# Patient Record
Sex: FEMALE | Race: ASIAN | NOT HISPANIC OR LATINO
[De-identification: names, ages, dates, MRNs, and addresses within clinical notes are randomized per-mention and may not be internally consistent; named-entity substitution may affect disease eponyms.]

---

## 2017-01-03 ENCOUNTER — APPOINTMENT (OUTPATIENT)
Dept: INTERNAL MEDICINE | Facility: CLINIC | Age: 77
End: 2017-01-03

## 2017-01-03 VITALS
WEIGHT: 98 LBS | DIASTOLIC BLOOD PRESSURE: 86 MMHG | HEART RATE: 88 BPM | BODY MASS INDEX: 19.76 KG/M2 | SYSTOLIC BLOOD PRESSURE: 132 MMHG | TEMPERATURE: 97.8 F | HEIGHT: 59 IN | OXYGEN SATURATION: 97 %

## 2017-01-04 LAB
25(OH)D3 SERPL-MCNC: 31 NG/ML
ALBUMIN SERPL ELPH-MCNC: 4.3 G/DL
ALP BLD-CCNC: 86 U/L
ALT SERPL-CCNC: 16 U/L
ANION GAP SERPL CALC-SCNC: 13 MMOL/L
AST SERPL-CCNC: 21 U/L
BASOPHILS # BLD AUTO: 0.01 K/UL
BASOPHILS NFR BLD AUTO: 0.2 %
BILIRUB SERPL-MCNC: 0.2 MG/DL
BUN SERPL-MCNC: 16 MG/DL
CALCIUM SERPL-MCNC: 9.6 MG/DL
CHLORIDE SERPL-SCNC: 104 MMOL/L
CHOLEST SERPL-MCNC: 209 MG/DL
CHOLEST/HDLC SERPL: 2.6 RATIO
CO2 SERPL-SCNC: 25 MMOL/L
CREAT SERPL-MCNC: 0.73 MG/DL
EOSINOPHIL # BLD AUTO: 0.02 K/UL
EOSINOPHIL NFR BLD AUTO: 0.3 %
GLUCOSE SERPL-MCNC: 102 MG/DL
HBA1C MFR BLD HPLC: 5.7 %
HCT VFR BLD CALC: 36.3 %
HDLC SERPL-MCNC: 80 MG/DL
HGB BLD-MCNC: 11.8 G/DL
IMM GRANULOCYTES NFR BLD AUTO: 0.2 %
LDLC SERPL CALC-MCNC: 113 MG/DL
LYMPHOCYTES # BLD AUTO: 2.05 K/UL
LYMPHOCYTES NFR BLD AUTO: 31.8 %
MAN DIFF?: NORMAL
MCHC RBC-ENTMCNC: 30.3 PG
MCHC RBC-ENTMCNC: 32.5 GM/DL
MCV RBC AUTO: 93.1 FL
MONOCYTES # BLD AUTO: 0.41 K/UL
MONOCYTES NFR BLD AUTO: 6.4 %
NEUTROPHILS # BLD AUTO: 3.94 K/UL
NEUTROPHILS NFR BLD AUTO: 61.1 %
PLATELET # BLD AUTO: 286 K/UL
POTASSIUM SERPL-SCNC: 5 MMOL/L
PROT SERPL-MCNC: 7.4 G/DL
RBC # BLD: 3.9 M/UL
RBC # FLD: 12.7 %
SODIUM SERPL-SCNC: 142 MMOL/L
TRIGL SERPL-MCNC: 81 MG/DL
TSH SERPL-ACNC: 1.55 UIU/ML
WBC # FLD AUTO: 6.44 K/UL

## 2017-01-10 ENCOUNTER — APPOINTMENT (OUTPATIENT)
Dept: CV DIAGNOSITCS | Facility: HOSPITAL | Age: 77
End: 2017-01-10

## 2017-02-02 ENCOUNTER — OTHER (OUTPATIENT)
Age: 77
End: 2017-02-02

## 2017-02-07 ENCOUNTER — APPOINTMENT (OUTPATIENT)
Dept: INTERNAL MEDICINE | Facility: CLINIC | Age: 77
End: 2017-02-07

## 2017-02-07 ENCOUNTER — APPOINTMENT (OUTPATIENT)
Dept: CARDIOLOGY | Facility: CLINIC | Age: 77
End: 2017-02-07

## 2017-02-07 ENCOUNTER — NON-APPOINTMENT (OUTPATIENT)
Age: 77
End: 2017-02-07

## 2017-02-07 VITALS
HEIGHT: 59 IN | HEART RATE: 91 BPM | DIASTOLIC BLOOD PRESSURE: 84 MMHG | WEIGHT: 96 LBS | OXYGEN SATURATION: 98 % | TEMPERATURE: 97.9 F | BODY MASS INDEX: 19.35 KG/M2 | SYSTOLIC BLOOD PRESSURE: 144 MMHG

## 2017-02-07 VITALS
HEART RATE: 74 BPM | SYSTOLIC BLOOD PRESSURE: 120 MMHG | BODY MASS INDEX: 18.95 KG/M2 | WEIGHT: 94 LBS | TEMPERATURE: 97.8 F | DIASTOLIC BLOOD PRESSURE: 70 MMHG | HEIGHT: 59 IN

## 2017-02-07 VITALS — DIASTOLIC BLOOD PRESSURE: 72 MMHG | SYSTOLIC BLOOD PRESSURE: 130 MMHG

## 2017-03-01 ENCOUNTER — MEDICATION RENEWAL (OUTPATIENT)
Age: 77
End: 2017-03-01

## 2017-03-01 ENCOUNTER — APPOINTMENT (OUTPATIENT)
Dept: CARDIOLOGY | Facility: CLINIC | Age: 77
End: 2017-03-01

## 2017-03-21 ENCOUNTER — MEDICATION RENEWAL (OUTPATIENT)
Age: 77
End: 2017-03-21

## 2017-05-24 ENCOUNTER — RX RENEWAL (OUTPATIENT)
Age: 77
End: 2017-05-24

## 2017-06-16 ENCOUNTER — RX RENEWAL (OUTPATIENT)
Age: 77
End: 2017-06-16

## 2017-08-11 ENCOUNTER — APPOINTMENT (OUTPATIENT)
Dept: INTERNAL MEDICINE | Facility: CLINIC | Age: 77
End: 2017-08-11
Payer: MEDICARE

## 2017-08-11 VITALS
WEIGHT: 97 LBS | BODY MASS INDEX: 19.56 KG/M2 | DIASTOLIC BLOOD PRESSURE: 80 MMHG | HEART RATE: 75 BPM | HEIGHT: 59 IN | OXYGEN SATURATION: 96 % | SYSTOLIC BLOOD PRESSURE: 132 MMHG | TEMPERATURE: 97.9 F

## 2017-08-11 PROCEDURE — 36415 COLL VENOUS BLD VENIPUNCTURE: CPT

## 2017-08-11 PROCEDURE — 99213 OFFICE O/P EST LOW 20 MIN: CPT | Mod: 25

## 2017-08-11 RX ORDER — CHROMIUM 200 MCG
1000 TABLET ORAL
Qty: 100 | Refills: 0 | Status: DISCONTINUED | COMMUNITY
Start: 2017-06-16 | End: 2017-08-11

## 2017-08-12 LAB — 25(OH)D3 SERPL-MCNC: 35.9 NG/ML

## 2017-08-14 ENCOUNTER — RX RENEWAL (OUTPATIENT)
Age: 77
End: 2017-08-14

## 2017-08-27 ENCOUNTER — EMERGENCY (EMERGENCY)
Facility: HOSPITAL | Age: 77
LOS: 1 days | Discharge: HOME | End: 2017-08-27
Attending: EMERGENCY MEDICINE | Admitting: EMERGENCY MEDICINE
Payer: COMMERCIAL

## 2017-08-27 VITALS
SYSTOLIC BLOOD PRESSURE: 157 MMHG | RESPIRATION RATE: 18 BRPM | WEIGHT: 95.9 LBS | DIASTOLIC BLOOD PRESSURE: 91 MMHG | OXYGEN SATURATION: 100 % | HEART RATE: 83 BPM | TEMPERATURE: 99 F

## 2017-08-27 DIAGNOSIS — Z90.710 ACQUIRED ABSENCE OF BOTH CERVIX AND UTERUS: Chronic | ICD-10-CM

## 2017-08-27 PROCEDURE — 99284 EMERGENCY DEPT VISIT MOD MDM: CPT | Mod: 25

## 2017-08-27 PROCEDURE — 71046 X-RAY EXAM CHEST 2 VIEWS: CPT

## 2017-08-27 PROCEDURE — 71020: CPT | Mod: 26

## 2017-08-27 PROCEDURE — 71100 X-RAY EXAM RIBS UNI 2 VIEWS: CPT

## 2017-08-27 PROCEDURE — 99284 EMERGENCY DEPT VISIT MOD MDM: CPT

## 2017-08-27 PROCEDURE — 71100 X-RAY EXAM RIBS UNI 2 VIEWS: CPT | Mod: 26

## 2017-08-27 RX ORDER — LIDOCAINE 4 G/100G
1 CREAM TOPICAL ONCE
Qty: 0 | Refills: 0 | Status: COMPLETED | OUTPATIENT
Start: 2017-08-27 | End: 2017-08-27

## 2017-08-27 RX ORDER — ACETAMINOPHEN 500 MG
650 TABLET ORAL ONCE
Qty: 0 | Refills: 0 | Status: COMPLETED | OUTPATIENT
Start: 2017-08-27 | End: 2017-08-27

## 2017-08-27 RX ORDER — LIDOCAINE 4 G/100G
1 CREAM TOPICAL
Qty: 5 | Refills: 0 | OUTPATIENT
Start: 2017-08-27 | End: 2017-09-01

## 2017-08-27 RX ADMIN — Medication 650 MILLIGRAM(S): at 17:06

## 2017-08-27 RX ADMIN — LIDOCAINE 1 PATCH: 4 CREAM TOPICAL at 17:06

## 2017-08-27 NOTE — ED PROVIDER NOTE - PROGRESS NOTE DETAILS
Pt pain improved with lidoderm patch and tylenol, Xray results discussed with patient, pt demonstrates use of incentive spirometer, advised to follow up with primary care doctor in 24-48 hours, she verbalizes understanding of follow up plan Attending MD Ryan: Patient re-evaluated and feelign better.  Radiology tests reviewed with patient and family.  Feels improved with lidocaine patch, will Rx.  Patient stable for discharge with incentive spirometer. Follow up instructions given, importance of follow up emphasized, return to ED parameters reviewed and patient verbalized understanding.  All questions answered, all concerns addressed.

## 2017-08-27 NOTE — ED PROVIDER NOTE - MEDICAL DECISION MAKING DETAILS
78 yo F PMHx osteoporosis rolled out of bed onto right back 3 days ago and reports persistent pain to right side of back, CXR, rib series, pain control, reevaluate

## 2017-08-27 NOTE — ED PROVIDER NOTE - NS ED ROS FT
GENERAL: No fever or chills, EYES: no change in vision, HEENT: no trouble swallowing or speaking, CARDIAC: no chest pain, PULMONARY: no cough, pain to right side of back with deep inspiration, GI: no abdominal pain, no nausea or no vomiting, no diarrhea or constipation, : No changes in urination, SKIN: no rashes, NEURO: no headache,  MSK: No joint pain ~Leeanna Simpson M.D. Resident

## 2017-08-27 NOTE — ED PROVIDER NOTE - OBJECTIVE STATEMENT
77 y F PMHx osteoporosis p/w fall 3 days ago. Pt. rolled out of bed onto right side of her back. She was trying to manage the pain at home with ibuprofen every four hours with minimal relief. Pt reports pain is next to her scapula and she has pain with deep breaths. Denies other areas of trauma, did not hit her head, denies headaches, vision changes, chest pain, palpitations.

## 2017-08-27 NOTE — ED PROVIDER NOTE - PHYSICAL EXAMINATION
Gen: AAOx3, non-toxic  Head: NCAT  HEENT: EOMI, oral mucosa moist, normal conjunctiva  Lung: CTAB, no respiratory distress, no wheezes/rhonchi/rales B/L, speaking in full sentences  CV: RRR, no murmurs, rubs or gallops  Abd: soft, NTND, no guarding  MSK: no visible deformities, tenderness to posterior thoracic ribs medial to right scapula  Neuro: No focal sensory or motor deficits  Skin: Warm, well perfused, no rash  Psych: normal affect.   ~Leeanna Simpson M.D. Resident

## 2017-08-27 NOTE — ED PROVIDER NOTE - PLAN OF CARE
You were seen in the Emergency Department for back pain on right side following a fall out of bed.   1) Advance activity as tolerated.   2) Use your incentive spirometer every hour.   3) Continue all previously prescribed medications as directed.    4) Follow up with your primary care physician in 24-48 hours   5) Return to the Emergency Department for worsening or persistent symptoms, and/or ANY NEW OR CONCERNING SYMPTOMS. If you have issues obtaining follow up, please call: [phone] to obtain a doctor or specialist who takes your insurance in your area.

## 2017-08-27 NOTE — ED ADULT NURSE NOTE - OBJECTIVE STATEMENT
77 yr old female came in after falling out of bed and hit her head. on assessment a and o x 3 lungs clear abd soft no tender no swelling no loc

## 2017-08-27 NOTE — ED PROVIDER NOTE - CARE PLAN
Principal Discharge DX:	Fracture of one rib of right side, initial encounter  Instructions for follow-up, activity and diet:	You were seen in the Emergency Department for back pain on right side following a fall out of bed.   1) Advance activity as tolerated.   2) Use your incentive spirometer every hour.   3) Continue all previously prescribed medications as directed.    4) Follow up with your primary care physician in 24-48 hours   5) Return to the Emergency Department for worsening or persistent symptoms, and/or ANY NEW OR CONCERNING SYMPTOMS. If you have issues obtaining follow up, please call: [phone] to obtain a doctor or specialist who takes your insurance in your area. Principal Discharge DX:	Fracture of one rib of right side, initial encounter  Instructions for follow-up, activity and diet:	You were seen in the Emergency Department for back pain on right side following a fall out of bed.   1) Advance activity as tolerated.   2) Use your incentive spirometer every hour.   3) Continue all previously prescribed medications as directed.    4) Follow up with your primary care physician in 24-48 hours   5) Return to the Emergency Department for worsening or persistent symptoms, and/or ANY NEW OR CONCERNING SYMPTOMS. If you have issues obtaining follow up, please call: [phone] to obtain a doctor or specialist who takes your insurance in your area.

## 2017-08-27 NOTE — ED PROVIDER NOTE - ATTENDING CONTRIBUTION TO CARE
Attending MD Ryan: I personally have seen and examined this patient.  Resident note reviewed and agree on plan of care and except where noted.  See below for details.     77F with PMH osteoporosis presents to the ED s/p fall out of bed three days ago.  Daughter who was sleeping in the bed next to her, reports that patient was sleeping on her stomach and when she went to roll over, she rolled off the bed.  Indicates pain in the R thoracic back medial to the scapular edge.  Denies numbness/weakness of extremities. Denies loss of urinary or bowel continence.  Denies fevers, chills, dizziness, weakness, change in vision.  Denies chest pain, shortness of breath, palpitations. Denies abdominal pain, nausea, vomiting, diarrhea, blood in stools. Denies dysuria, hematuria, change in urinary habits including frequency, urgency. Denies headache.  On exam, head NCAT, CN2-12 grossly intact, PERRL, FROM at neck, no tenderness to palpation or stepoffs along length of spine, +tenderness to palpation medial (distal third) to the medial edge of the scapula (T5-7 level), lungs CTAB with good inspiratory effort, +S1S2, no m/r/g, abdomen soft with +BS, NT, ND, no CVAT, moving all extremities with 5/5 strength bilateral upper and lower extremities, good and equal  strength bilaterally, sensory grossly intact; A/P: 77F with R thoracic back pain, CXR and ribs series r/o rib fx, lido patch, pain control, reassess

## 2017-08-29 RX ORDER — PREGABALIN 225 MG/1
0 CAPSULE ORAL
Qty: 0 | Refills: 0 | COMMUNITY

## 2017-08-29 RX ORDER — CHOLECALCIFEROL (VITAMIN D3) 125 MCG
1 CAPSULE ORAL
Qty: 0 | Refills: 0 | COMMUNITY

## 2017-08-30 ENCOUNTER — APPOINTMENT (OUTPATIENT)
Dept: INTERNAL MEDICINE | Facility: CLINIC | Age: 77
End: 2017-08-30
Payer: MEDICARE

## 2017-08-30 VITALS
BODY MASS INDEX: 19.56 KG/M2 | WEIGHT: 97 LBS | DIASTOLIC BLOOD PRESSURE: 72 MMHG | SYSTOLIC BLOOD PRESSURE: 134 MMHG | TEMPERATURE: 98.2 F | OXYGEN SATURATION: 97 % | HEIGHT: 59 IN | HEART RATE: 79 BPM

## 2017-08-30 PROCEDURE — 99213 OFFICE O/P EST LOW 20 MIN: CPT

## 2017-09-11 ENCOUNTER — RX RENEWAL (OUTPATIENT)
Age: 77
End: 2017-09-11

## 2017-09-14 ENCOUNTER — RX RENEWAL (OUTPATIENT)
Age: 77
End: 2017-09-14

## 2017-09-19 ENCOUNTER — RX RENEWAL (OUTPATIENT)
Age: 77
End: 2017-09-19

## 2017-10-02 ENCOUNTER — RX RENEWAL (OUTPATIENT)
Age: 77
End: 2017-10-02

## 2017-10-09 ENCOUNTER — FORM ENCOUNTER (OUTPATIENT)
Age: 77
End: 2017-10-09

## 2017-10-10 ENCOUNTER — OUTPATIENT (OUTPATIENT)
Dept: OUTPATIENT SERVICES | Facility: HOSPITAL | Age: 77
LOS: 1 days | End: 2017-10-10
Payer: COMMERCIAL

## 2017-10-10 ENCOUNTER — APPOINTMENT (OUTPATIENT)
Dept: MAMMOGRAPHY | Facility: IMAGING CENTER | Age: 77
End: 2017-10-10
Payer: MEDICARE

## 2017-10-10 DIAGNOSIS — R01.1 CARDIAC MURMUR, UNSPECIFIED: ICD-10-CM

## 2017-10-10 DIAGNOSIS — Z90.710 ACQUIRED ABSENCE OF BOTH CERVIX AND UTERUS: Chronic | ICD-10-CM

## 2017-10-10 PROCEDURE — 77063 BREAST TOMOSYNTHESIS BI: CPT | Mod: 26

## 2017-10-10 PROCEDURE — 77067 SCR MAMMO BI INCL CAD: CPT

## 2017-10-10 PROCEDURE — 77063 BREAST TOMOSYNTHESIS BI: CPT

## 2017-10-10 PROCEDURE — G0202: CPT | Mod: 26

## 2017-11-10 ENCOUNTER — APPOINTMENT (OUTPATIENT)
Dept: INTERNAL MEDICINE | Facility: CLINIC | Age: 77
End: 2017-11-10
Payer: MEDICARE

## 2017-11-10 VITALS
WEIGHT: 97 LBS | DIASTOLIC BLOOD PRESSURE: 76 MMHG | SYSTOLIC BLOOD PRESSURE: 134 MMHG | HEIGHT: 59 IN | OXYGEN SATURATION: 95 % | HEART RATE: 60 BPM | BODY MASS INDEX: 19.56 KG/M2 | TEMPERATURE: 98.3 F

## 2017-11-10 PROCEDURE — 99213 OFFICE O/P EST LOW 20 MIN: CPT | Mod: 25

## 2017-11-10 PROCEDURE — G0008: CPT

## 2017-11-10 PROCEDURE — 90662 IIV NO PRSV INCREASED AG IM: CPT

## 2017-11-23 ENCOUNTER — FORM ENCOUNTER (OUTPATIENT)
Age: 77
End: 2017-11-23

## 2017-11-24 ENCOUNTER — APPOINTMENT (OUTPATIENT)
Dept: RADIOLOGY | Facility: IMAGING CENTER | Age: 77
End: 2017-11-24
Payer: MEDICARE

## 2017-11-24 ENCOUNTER — OUTPATIENT (OUTPATIENT)
Dept: OUTPATIENT SERVICES | Facility: HOSPITAL | Age: 77
LOS: 1 days | End: 2017-11-24
Payer: COMMERCIAL

## 2017-11-24 DIAGNOSIS — M81.0 AGE-RELATED OSTEOPOROSIS WITHOUT CURRENT PATHOLOGICAL FRACTURE: ICD-10-CM

## 2017-11-24 DIAGNOSIS — Z90.710 ACQUIRED ABSENCE OF BOTH CERVIX AND UTERUS: Chronic | ICD-10-CM

## 2017-11-24 PROCEDURE — 77080 DXA BONE DENSITY AXIAL: CPT | Mod: 26

## 2017-11-24 PROCEDURE — 77080 DXA BONE DENSITY AXIAL: CPT

## 2017-12-14 ENCOUNTER — RX RENEWAL (OUTPATIENT)
Age: 77
End: 2017-12-14

## 2018-01-02 ENCOUNTER — RX RENEWAL (OUTPATIENT)
Age: 78
End: 2018-01-02

## 2018-02-07 ENCOUNTER — APPOINTMENT (OUTPATIENT)
Dept: ENDOCRINOLOGY | Facility: CLINIC | Age: 78
End: 2018-02-07
Payer: MEDICARE

## 2018-02-07 VITALS
SYSTOLIC BLOOD PRESSURE: 120 MMHG | OXYGEN SATURATION: 97 % | HEART RATE: 95 BPM | HEIGHT: 59 IN | DIASTOLIC BLOOD PRESSURE: 70 MMHG | BODY MASS INDEX: 19.56 KG/M2 | WEIGHT: 97 LBS

## 2018-02-07 DIAGNOSIS — D25.9 LEIOMYOMA OF UTERUS, UNSPECIFIED: ICD-10-CM

## 2018-02-07 PROCEDURE — 99204 OFFICE O/P NEW MOD 45 MIN: CPT | Mod: 25

## 2018-02-07 PROCEDURE — 36415 COLL VENOUS BLD VENIPUNCTURE: CPT

## 2018-02-07 RX ORDER — IBUPROFEN 600 MG/1
600 TABLET, FILM COATED ORAL 3 TIMES DAILY
Qty: 45 | Refills: 0 | Status: DISCONTINUED | COMMUNITY
Start: 2017-08-30 | End: 2018-02-07

## 2018-02-07 RX ORDER — RANITIDINE HYDROCHLORIDE 150 MG/1
150 CAPSULE ORAL
Qty: 14 | Refills: 0 | Status: DISCONTINUED | COMMUNITY
Start: 2017-08-30 | End: 2018-02-07

## 2018-02-07 RX ORDER — LIDOCAINE 4 %
4 ADHESIVE PATCH, MEDICATED TOPICAL
Qty: 3 | Refills: 0 | Status: DISCONTINUED | COMMUNITY
Start: 2017-08-30 | End: 2018-02-07

## 2018-02-09 ENCOUNTER — OTHER (OUTPATIENT)
Age: 78
End: 2018-02-09

## 2018-02-12 ENCOUNTER — APPOINTMENT (OUTPATIENT)
Dept: INTERNAL MEDICINE | Facility: CLINIC | Age: 78
End: 2018-02-12
Payer: MEDICARE

## 2018-02-12 VITALS
TEMPERATURE: 97.9 F | HEIGHT: 59 IN | HEART RATE: 80 BPM | OXYGEN SATURATION: 96 % | SYSTOLIC BLOOD PRESSURE: 140 MMHG | BODY MASS INDEX: 19.56 KG/M2 | WEIGHT: 97 LBS | DIASTOLIC BLOOD PRESSURE: 80 MMHG

## 2018-02-12 DIAGNOSIS — S22.31XK: ICD-10-CM

## 2018-02-12 DIAGNOSIS — Z80.41 FAMILY HISTORY OF MALIGNANT NEOPLASM OF OVARY: ICD-10-CM

## 2018-02-12 PROCEDURE — 99397 PER PM REEVAL EST PAT 65+ YR: CPT

## 2018-02-13 LAB
APPEARANCE: CLEAR
BILIRUBIN URINE: NEGATIVE
BLOOD URINE: NEGATIVE
COLOR: YELLOW
GLUCOSE QUALITATIVE U: NEGATIVE MG/DL
KETONES URINE: NEGATIVE
LEUKOCYTE ESTERASE URINE: NEGATIVE
NITRITE URINE: NEGATIVE
PH URINE: 5.5
PROTEIN URINE: NEGATIVE MG/DL
SPECIFIC GRAVITY URINE: 1.01
UROBILINOGEN URINE: NEGATIVE MG/DL

## 2018-02-14 LAB — BACTERIA UR CULT: NORMAL

## 2018-02-19 LAB
24R-OH-CALCIDIOL SERPL-MCNC: 80.3 PG/ML
25(OH)D3 SERPL-MCNC: 40.2 NG/ML
ALBUMIN SERPL ELPH-MCNC: 4.3 G/DL
ALP BLD-CCNC: 83 U/L
ALP BONE SERPL-MCNC: 11 MCG/L
ALT SERPL-CCNC: 15 U/L
ANION GAP SERPL CALC-SCNC: 13 MMOL/L
AST SERPL-CCNC: 20 U/L
BASOPHILS # BLD AUTO: 0.01 K/UL
BASOPHILS NFR BLD AUTO: 0.2 %
BILIRUB SERPL-MCNC: 0.2 MG/DL
BUN SERPL-MCNC: 19 MG/DL
CALCIUM SERPL-MCNC: 9.6 MG/DL
CALCIUM SERPL-MCNC: 9.6 MG/DL
CHLORIDE SERPL-SCNC: 105 MMOL/L
CHOLEST SERPL-MCNC: 224 MG/DL
CHOLEST/HDLC SERPL: 3 RATIO
CO2 SERPL-SCNC: 25 MMOL/L
CREAT SERPL-MCNC: 0.71 MG/DL
EOSINOPHIL # BLD AUTO: 0.03 K/UL
EOSINOPHIL NFR BLD AUTO: 0.5 %
GLUCOSE SERPL-MCNC: 90 MG/DL
HBA1C MFR BLD HPLC: 5.7 %
HCT VFR BLD CALC: 35.4 %
HDLC SERPL-MCNC: 75 MG/DL
HGB BLD-MCNC: 11.7 G/DL
IMM GRANULOCYTES NFR BLD AUTO: 0 %
LDLC SERPL CALC-MCNC: 137 MG/DL
LYMPHOCYTES # BLD AUTO: 1.71 K/UL
LYMPHOCYTES NFR BLD AUTO: 27 %
MAGNESIUM SERPL-MCNC: 2.3 MG/DL
MAN DIFF?: NORMAL
MCHC RBC-ENTMCNC: 30.2 PG
MCHC RBC-ENTMCNC: 33.1 GM/DL
MCV RBC AUTO: 91.5 FL
MONOCYTES # BLD AUTO: 0.43 K/UL
MONOCYTES NFR BLD AUTO: 6.8 %
NEUTROPHILS # BLD AUTO: 4.15 K/UL
NEUTROPHILS NFR BLD AUTO: 65.5 %
PARATHYROID HORMONE INTACT: 41 PG/ML
PLATELET # BLD AUTO: 280 K/UL
POTASSIUM SERPL-SCNC: 5.3 MMOL/L
PROT SERPL-MCNC: 7.8 G/DL
RBC # BLD: 3.87 M/UL
RBC # FLD: 12.6 %
SODIUM SERPL-SCNC: 143 MMOL/L
T4 FREE SERPL-MCNC: 1.2 NG/DL
TRIGL SERPL-MCNC: 62 MG/DL
TSH SERPL-ACNC: 2.13 UIU/ML
WBC # FLD AUTO: 6.33 K/UL

## 2018-03-12 ENCOUNTER — RX RENEWAL (OUTPATIENT)
Age: 78
End: 2018-03-12

## 2018-03-14 ENCOUNTER — APPOINTMENT (OUTPATIENT)
Dept: ENDOCRINOLOGY | Facility: CLINIC | Age: 78
End: 2018-03-14
Payer: MEDICARE

## 2018-03-14 VITALS
SYSTOLIC BLOOD PRESSURE: 130 MMHG | WEIGHT: 97 LBS | HEART RATE: 90 BPM | DIASTOLIC BLOOD PRESSURE: 80 MMHG | HEIGHT: 59 IN | OXYGEN SATURATION: 96 % | BODY MASS INDEX: 19.56 KG/M2

## 2018-03-14 PROCEDURE — 99213 OFFICE O/P EST LOW 20 MIN: CPT | Mod: 25

## 2018-03-14 PROCEDURE — 96401 CHEMO ANTI-NEOPL SQ/IM: CPT

## 2018-03-24 ENCOUNTER — RX RENEWAL (OUTPATIENT)
Age: 78
End: 2018-03-24

## 2018-05-02 ENCOUNTER — EMERGENCY (EMERGENCY)
Facility: HOSPITAL | Age: 78
LOS: 1 days | Discharge: HOME | End: 2018-05-02
Attending: EMERGENCY MEDICINE | Admitting: EMERGENCY MEDICINE
Payer: MEDICARE

## 2018-05-02 VITALS
OXYGEN SATURATION: 100 % | DIASTOLIC BLOOD PRESSURE: 68 MMHG | HEART RATE: 80 BPM | RESPIRATION RATE: 16 BRPM | SYSTOLIC BLOOD PRESSURE: 138 MMHG | TEMPERATURE: 98 F

## 2018-05-02 DIAGNOSIS — Z90.710 ACQUIRED ABSENCE OF BOTH CERVIX AND UTERUS: Chronic | ICD-10-CM

## 2018-05-02 PROCEDURE — 93010 ELECTROCARDIOGRAM REPORT: CPT

## 2018-05-02 PROCEDURE — 99284 EMERGENCY DEPT VISIT MOD MDM: CPT | Mod: 25,GC

## 2018-05-03 VITALS
HEART RATE: 96 BPM | OXYGEN SATURATION: 100 % | RESPIRATION RATE: 15 BRPM | SYSTOLIC BLOOD PRESSURE: 126 MMHG | TEMPERATURE: 97 F | DIASTOLIC BLOOD PRESSURE: 65 MMHG

## 2018-05-03 LAB
ALBUMIN SERPL ELPH-MCNC: 3.9 G/DL — SIGNIFICANT CHANGE UP (ref 3.3–5)
ALP SERPL-CCNC: 73 U/L — SIGNIFICANT CHANGE UP (ref 40–120)
ALT FLD-CCNC: 19 U/L — SIGNIFICANT CHANGE UP (ref 4–33)
APPEARANCE UR: CLEAR — SIGNIFICANT CHANGE UP
AST SERPL-CCNC: 26 U/L — SIGNIFICANT CHANGE UP (ref 4–32)
BASOPHILS # BLD AUTO: 0.01 K/UL — SIGNIFICANT CHANGE UP (ref 0–0.2)
BASOPHILS NFR BLD AUTO: 0.1 % — SIGNIFICANT CHANGE UP (ref 0–2)
BILIRUB SERPL-MCNC: 0.3 MG/DL — SIGNIFICANT CHANGE UP (ref 0.2–1.2)
BILIRUB UR-MCNC: NEGATIVE — SIGNIFICANT CHANGE UP
BLOOD UR QL VISUAL: NEGATIVE — SIGNIFICANT CHANGE UP
BUN SERPL-MCNC: 14 MG/DL — SIGNIFICANT CHANGE UP (ref 7–23)
CALCIUM SERPL-MCNC: 8.3 MG/DL — LOW (ref 8.4–10.5)
CHLORIDE SERPL-SCNC: 95 MMOL/L — LOW (ref 98–107)
CO2 SERPL-SCNC: 22 MMOL/L — SIGNIFICANT CHANGE UP (ref 22–31)
COLOR SPEC: SIGNIFICANT CHANGE UP
CREAT SERPL-MCNC: 0.66 MG/DL — SIGNIFICANT CHANGE UP (ref 0.5–1.3)
EOSINOPHIL # BLD AUTO: 0.01 K/UL — SIGNIFICANT CHANGE UP (ref 0–0.5)
EOSINOPHIL NFR BLD AUTO: 0.1 % — SIGNIFICANT CHANGE UP (ref 0–6)
GLUCOSE SERPL-MCNC: 101 MG/DL — HIGH (ref 70–99)
GLUCOSE UR-MCNC: NEGATIVE — SIGNIFICANT CHANGE UP
HCT VFR BLD CALC: 32.2 % — LOW (ref 34.5–45)
HGB BLD-MCNC: 10.9 G/DL — LOW (ref 11.5–15.5)
IMM GRANULOCYTES # BLD AUTO: 0.03 # — SIGNIFICANT CHANGE UP
IMM GRANULOCYTES NFR BLD AUTO: 0.3 % — SIGNIFICANT CHANGE UP (ref 0–1.5)
KETONES UR-MCNC: SIGNIFICANT CHANGE UP
LEUKOCYTE ESTERASE UR-ACNC: HIGH
LYMPHOCYTES # BLD AUTO: 0.96 K/UL — LOW (ref 1–3.3)
LYMPHOCYTES # BLD AUTO: 8.6 % — LOW (ref 13–44)
MCHC RBC-ENTMCNC: 30.5 PG — SIGNIFICANT CHANGE UP (ref 27–34)
MCHC RBC-ENTMCNC: 33.9 % — SIGNIFICANT CHANGE UP (ref 32–36)
MCV RBC AUTO: 90.2 FL — SIGNIFICANT CHANGE UP (ref 80–100)
MONOCYTES # BLD AUTO: 0.82 K/UL — SIGNIFICANT CHANGE UP (ref 0–0.9)
MONOCYTES NFR BLD AUTO: 7.4 % — SIGNIFICANT CHANGE UP (ref 2–14)
NEUTROPHILS # BLD AUTO: 9.32 K/UL — HIGH (ref 1.8–7.4)
NEUTROPHILS NFR BLD AUTO: 83.5 % — HIGH (ref 43–77)
NITRITE UR-MCNC: NEGATIVE — SIGNIFICANT CHANGE UP
NRBC # FLD: 0 — SIGNIFICANT CHANGE UP
PH UR: 6 — SIGNIFICANT CHANGE UP (ref 4.6–8)
PLATELET # BLD AUTO: 227 K/UL — SIGNIFICANT CHANGE UP (ref 150–400)
PMV BLD: 10.5 FL — SIGNIFICANT CHANGE UP (ref 7–13)
POTASSIUM SERPL-MCNC: 4.4 MMOL/L — SIGNIFICANT CHANGE UP (ref 3.5–5.3)
POTASSIUM SERPL-SCNC: 4.4 MMOL/L — SIGNIFICANT CHANGE UP (ref 3.5–5.3)
PROT SERPL-MCNC: 7 G/DL — SIGNIFICANT CHANGE UP (ref 6–8.3)
PROT UR-MCNC: NEGATIVE MG/DL — SIGNIFICANT CHANGE UP
RBC # BLD: 3.57 M/UL — LOW (ref 3.8–5.2)
RBC # FLD: 12 % — SIGNIFICANT CHANGE UP (ref 10.3–14.5)
RBC CASTS # UR COMP ASSIST: SIGNIFICANT CHANGE UP (ref 0–?)
SODIUM SERPL-SCNC: 131 MMOL/L — LOW (ref 135–145)
SP GR SPEC: 1.01 — SIGNIFICANT CHANGE UP (ref 1–1.04)
SQUAMOUS # UR AUTO: SIGNIFICANT CHANGE UP
UROBILINOGEN FLD QL: NORMAL MG/DL — SIGNIFICANT CHANGE UP
WBC # BLD: 11.15 K/UL — HIGH (ref 3.8–10.5)
WBC # FLD AUTO: 11.15 K/UL — HIGH (ref 3.8–10.5)
WBC CLUMPS #/AREA URNS HPF: PRESENT — HIGH (ref 0–?)
WBC UR QL: HIGH (ref 0–?)

## 2018-05-03 PROCEDURE — 70450 CT HEAD/BRAIN W/O DYE: CPT | Mod: 26

## 2018-05-03 PROCEDURE — 70486 CT MAXILLOFACIAL W/O DYE: CPT | Mod: 26

## 2018-05-03 RX ORDER — SODIUM CHLORIDE 9 MG/ML
1000 INJECTION INTRAMUSCULAR; INTRAVENOUS; SUBCUTANEOUS ONCE
Qty: 0 | Refills: 0 | Status: COMPLETED | OUTPATIENT
Start: 2018-05-03 | End: 2018-05-03

## 2018-05-03 RX ORDER — ONDANSETRON 8 MG/1
4 TABLET, FILM COATED ORAL ONCE
Qty: 0 | Refills: 0 | Status: COMPLETED | OUTPATIENT
Start: 2018-05-03 | End: 2018-05-03

## 2018-05-03 RX ADMIN — SODIUM CHLORIDE 2000 MILLILITER(S): 9 INJECTION INTRAMUSCULAR; INTRAVENOUS; SUBCUTANEOUS at 01:19

## 2018-05-03 RX ADMIN — ONDANSETRON 4 MILLIGRAM(S): 8 TABLET, FILM COATED ORAL at 01:19

## 2018-05-03 NOTE — ED PROVIDER NOTE - ATTENDING CONTRIBUTION TO CARE
78F PMH osteoporosis(not on any meds) p/w NVD x1d, then episode LOC while standing, subsequent minimal facial trauma. Vitals wnl, exam as above. EKG wnl.  ddx: Likely gastro w/ subsequent syncope 2/2 vasovagal and aspect of dehydration   CBC, cmp. CTH/maxillofacial given age and tenderness on exam. Symptom control, reassess. 78F PMH osteoporosis (not on any meds) and hysterectomy (2/2 fibroid) p/w NVD x1d, then episode LOC while standing, subsequent minimal facial trauma. Vitals wnl, exam as above. EKG wnl.  ddx: Likely gastro w/ subsequent syncope 2/2 vasovagal and aspect of dehydration   CBC, cmp. CTH/maxillofacial given age and tenderness on exam. Symptom control, reassess.

## 2018-05-03 NOTE — ED PROVIDER NOTE - OBJECTIVE STATEMENT
77y/o F PMH osteoporosis presenting with nausea, vomiting and diarrhea that started earlier today. Pt states she ate chicken and rice, unsure if it was due to food to ate that triggered her symptoms. She had one episode of vomiting, NBNB, and multiple episodes of diarrhea about every half hour, not watery, loose stools. No fevers or chills. Patient also had one episode of "fainting" per daughter, unwitnessed. Pt's daughter was washing dishes and heard patient fall. Patient endorses dizziness before fainting, was standing at the time and tried to sit down in chair, ended up hitting left cheek causing bruise, associated with some headache. No pain anywhere else in body. No abdominal pain, no dysuria. No melena, hematochezia, hematuria. 77y/o F PMH osteoporosis presenting with nausea, vomiting and diarrhea that started earlier today. Pt states she ate chicken and rice, unsure if it was due to food to ate that triggered her symptoms. She had one episode of vomiting, NBNB, and multiple episodes of diarrhea about every half hour, not watery, loose stools. No fevers or chills. Patient also had one episode of "fainting" per daughter, unwitnessed. Pt's daughter was washing dishes and heard patient fall. Patient endorses dizziness before fainting, was standing at the time and tried to sit down in chair, ended up hitting left cheek causing bruise, associated with some headache. No pain anywhere else in body. No abdominal pain, no dysuria. No melena, hematochezia, hematuria.  [place]: 78F PMH osteoporosis(not on any meds) p/w NVD and LOC. Pt w/ 1d of multiple NBNB NVD. While the kitchen, pt recalls feeling hot flash and lightheaded then remembers waking up in living room. Per daughter, pt was feeling lightheaded, sat in chair then fell forward onto floor, LOC <30sec. Pt now c/o L cheek pain, no HA or neck/torin pain or other pain from fall. No prior syncope, no FMH sudden death. Denies abd pain, f/c, SOB/CP, urinary complaints, black/bloody stool, weakness/numbness. 79y/o F PMH osteoporosis presenting with nausea, vomiting and diarrhea that started earlier today. Pt states she ate chicken and rice, unsure if it was due to food to ate that triggered her symptoms. She had one episode of vomiting, NBNB, and multiple episodes of diarrhea about every half hour, not watery, loose stools. No fevers or chills. Patient also had one episode of "fainting" per daughter, unwitnessed. Pt's daughter was washing dishes and heard patient fall. Patient endorses dizziness before fainting, was standing at the time and tried to sit down in chair, ended up hitting left cheek causing bruise, associated with some headache. No pain anywhere else in body. No abdominal pain, no dysuria. No melena, hematochezia, hematuria.  [place]: 78F PMH osteoporosis (not on any meds) and hysterectomy (2/2 fibroid) p/w NVD and LOC. Pt w/ 1d of multiple NBNB NVD. While the kitchen, pt recalls feeling hot flash and lightheaded then remembers waking up in living room. Per daughter, pt was feeling lightheaded, sat in chair then fell forward onto floor, LOC <30sec. Pt now c/o L cheek pain, no HA or neck/torin pain or other pain from fall. No prior syncope, no FMH sudden death. Denies abd pain, f/c, SOB/CP, urinary complaints, black/bloody stool, weakness/numbness.

## 2018-05-03 NOTE — ED PROVIDER NOTE - PROGRESS NOTE DETAILS
Patient without nausea, vomiting or diarrhea since being in the hospital. CT head and CT maxillofacial negative. Will d/c home after Po challenge. [place]: Asymptomatic, tolerating PO. Slight anemia, other labs wnl. UA weakly positive, has no urinary complaints, will send urine cx and no tx for now. CT no acute pathology. Given copy of results, comfortable for dc, outpt pmd f/u.

## 2018-05-03 NOTE — ED ADULT NURSE NOTE - OBJECTIVE STATEMENT
Pt recd A+Ox3. C/o N/V/D and weakness with one episode of fainting today. Pt was cooking when she felt dizziness. She went to sit down and then fainted and fell off of chair. Pt hit left cheek on floor- bruise noted to cheek. Episode lasted about 30 seconds. Pt denies having any CP or SOB. Reports some nausea. Denies any pain at this time. VS as noted. Will continue to monitor.

## 2018-05-03 NOTE — ED PROVIDER NOTE - PHYSICAL EXAMINATION
no LE edema, normal equal distal pulses. steady unassisted gait, no symptoms w/ standing.   L infraorbital ecchymosis, mils swelling, localized bony ttp. PERRL, EOMI, no nystagmus. steady unassisted gait, strength 5/5, no spinal ttp, neck FROM, FROM all extremities, no bony ttp.

## 2018-05-04 LAB
BACTERIA UR CULT: SIGNIFICANT CHANGE UP
SPECIMEN SOURCE: SIGNIFICANT CHANGE UP

## 2018-05-07 ENCOUNTER — RX RENEWAL (OUTPATIENT)
Age: 78
End: 2018-05-07

## 2018-06-01 ENCOUNTER — EMERGENCY (EMERGENCY)
Facility: HOSPITAL | Age: 78
LOS: 1 days | Discharge: HOME | End: 2018-06-01
Attending: EMERGENCY MEDICINE | Admitting: EMERGENCY MEDICINE
Payer: MEDICARE

## 2018-06-01 VITALS
TEMPERATURE: 98 F | DIASTOLIC BLOOD PRESSURE: 76 MMHG | RESPIRATION RATE: 16 BRPM | HEART RATE: 70 BPM | SYSTOLIC BLOOD PRESSURE: 143 MMHG | OXYGEN SATURATION: 100 %

## 2018-06-01 DIAGNOSIS — Z90.710 ACQUIRED ABSENCE OF BOTH CERVIX AND UTERUS: Chronic | ICD-10-CM

## 2018-06-01 LAB
APPEARANCE UR: CLEAR — SIGNIFICANT CHANGE UP
BACTERIA # UR AUTO: SIGNIFICANT CHANGE UP
BASOPHILS # BLD AUTO: 0.02 K/UL — SIGNIFICANT CHANGE UP (ref 0–0.2)
BASOPHILS NFR BLD AUTO: 0.3 % — SIGNIFICANT CHANGE UP (ref 0–2)
BILIRUB UR-MCNC: NEGATIVE — SIGNIFICANT CHANGE UP
BLOOD UR QL VISUAL: NEGATIVE — SIGNIFICANT CHANGE UP
COLOR SPEC: SIGNIFICANT CHANGE UP
EOSINOPHIL # BLD AUTO: 0.06 K/UL — SIGNIFICANT CHANGE UP (ref 0–0.5)
EOSINOPHIL NFR BLD AUTO: 0.9 % — SIGNIFICANT CHANGE UP (ref 0–6)
GLUCOSE UR-MCNC: NEGATIVE — SIGNIFICANT CHANGE UP
HCT VFR BLD CALC: 32.8 % — LOW (ref 34.5–45)
HGB BLD-MCNC: 11.2 G/DL — LOW (ref 11.5–15.5)
IMM GRANULOCYTES # BLD AUTO: 0.01 # — SIGNIFICANT CHANGE UP
IMM GRANULOCYTES NFR BLD AUTO: 0.2 % — SIGNIFICANT CHANGE UP (ref 0–1.5)
KETONES UR-MCNC: NEGATIVE — SIGNIFICANT CHANGE UP
LEUKOCYTE ESTERASE UR-ACNC: HIGH
LYMPHOCYTES # BLD AUTO: 2.3 K/UL — SIGNIFICANT CHANGE UP (ref 1–3.3)
LYMPHOCYTES # BLD AUTO: 36.4 % — SIGNIFICANT CHANGE UP (ref 13–44)
MCHC RBC-ENTMCNC: 29.9 PG — SIGNIFICANT CHANGE UP (ref 27–34)
MCHC RBC-ENTMCNC: 34.1 % — SIGNIFICANT CHANGE UP (ref 32–36)
MCV RBC AUTO: 87.7 FL — SIGNIFICANT CHANGE UP (ref 80–100)
MONOCYTES # BLD AUTO: 0.73 K/UL — SIGNIFICANT CHANGE UP (ref 0–0.9)
MONOCYTES NFR BLD AUTO: 11.6 % — SIGNIFICANT CHANGE UP (ref 2–14)
NEUTROPHILS # BLD AUTO: 3.2 K/UL — SIGNIFICANT CHANGE UP (ref 1.8–7.4)
NEUTROPHILS NFR BLD AUTO: 50.6 % — SIGNIFICANT CHANGE UP (ref 43–77)
NITRITE UR-MCNC: NEGATIVE — SIGNIFICANT CHANGE UP
NON-SQ EPI CELLS # UR AUTO: <1 — SIGNIFICANT CHANGE UP
NRBC # FLD: 0 — SIGNIFICANT CHANGE UP
PH UR: 7 — SIGNIFICANT CHANGE UP (ref 4.6–8)
PLATELET # BLD AUTO: 271 K/UL — SIGNIFICANT CHANGE UP (ref 150–400)
PMV BLD: 9.6 FL — SIGNIFICANT CHANGE UP (ref 7–13)
PROT UR-MCNC: NEGATIVE MG/DL — SIGNIFICANT CHANGE UP
RBC # BLD: 3.74 M/UL — LOW (ref 3.8–5.2)
RBC # FLD: 12.6 % — SIGNIFICANT CHANGE UP (ref 10.3–14.5)
SP GR SPEC: 1 — SIGNIFICANT CHANGE UP (ref 1–1.04)
UROBILINOGEN FLD QL: NORMAL MG/DL — SIGNIFICANT CHANGE UP
WBC # BLD: 6.32 K/UL — SIGNIFICANT CHANGE UP (ref 3.8–10.5)
WBC # FLD AUTO: 6.32 K/UL — SIGNIFICANT CHANGE UP (ref 3.8–10.5)
WBC UR QL: HIGH (ref 0–?)

## 2018-06-01 PROCEDURE — 99284 EMERGENCY DEPT VISIT MOD MDM: CPT | Mod: GC

## 2018-06-01 RX ORDER — SODIUM CHLORIDE 9 MG/ML
1000 INJECTION, SOLUTION INTRAVENOUS
Qty: 0 | Refills: 0 | Status: DISCONTINUED | OUTPATIENT
Start: 2018-06-01 | End: 2018-06-05

## 2018-06-01 RX ADMIN — SODIUM CHLORIDE 100 MILLILITER(S): 9 INJECTION, SOLUTION INTRAVENOUS at 23:41

## 2018-06-01 NOTE — ED PROVIDER NOTE - PROGRESS NOTE DETAILS
xray showed nodule, ct completed showing no large nodule but small nodules with recommended f/u in 6 month. pt. w/ uti will d/c with pcp follow up

## 2018-06-01 NOTE — ED PROVIDER NOTE - ATTENDING CONTRIBUTION TO CARE
I performed a face-to-face evaluation of the patient and performed a history and physical examination. I agree with the history and physical examination.    Older F, 1 month 5-lb unintentional wt. loss, N/V, HA x 1 day. PE unremarkable. Concern for cancer. Labs, CXR. Outpatient f/u.

## 2018-06-01 NOTE — ED PROVIDER NOTE - PLAN OF CARE
During your ED visit you were evaluated for weakness. You were found to have a UTI. your xray showed a possible lung nodule a ct scan was completed which did not show the same lung nodule but smaller lung nodules, Follow up with your pcp and repeat ct scan in 6 months. For your UTI take keflex 500mg every 12 hours for 5 days. Return to the ED if you exhibit any new, continued or worsening symptoms.

## 2018-06-01 NOTE — ED PROVIDER NOTE - CARE PLAN
Principal Discharge DX:	Weight loss  Secondary Diagnosis:	Nausea & vomiting Principal Discharge DX:	UTI (urinary tract infection)  Assessment and plan of treatment:	During your ED visit you were evaluated for weakness. You were found to have a UTI. your xray showed a possible lung nodule a ct scan was completed which did not show the same lung nodule but smaller lung nodules, Follow up with your pcp and repeat ct scan in 6 months. For your UTI take keflex 500mg every 12 hours for 5 days. Return to the ED if you exhibit any new, continued or worsening symptoms.  Secondary Diagnosis:	Nausea & vomiting

## 2018-06-01 NOTE — ED PROVIDER NOTE - OBJECTIVE STATEMENT
79 y/o F with PMH of LENCHO p/w nausea and fatigue for 1month. Pt states she was seen in the ED 1 month prior for nausea and vomiting and was treated for a viral infection. She reports that over the last month she has been losing weight and not had much of an appetite. She denies fever, chill, chest pain, SOB, diarrhea, dysuria, Cough, chest pain, LE swelling. She states she has not had much of an appetite. She reports having colonscopy 1-2 years prior which was normal and routine mammograms. Her hysterectomy was due to fibroids. SHe reporst a mild headache today which improved with tylenol

## 2018-06-01 NOTE — ED ADULT NURSE NOTE - OBJECTIVE STATEMENT
John RN received pt to spot 22 A&ox4 family at bedside c/o intermittent N/V associated with decreased PO intake worsening since visit here approx 1 month ago. Pt also c/o significant weight loss since then, denies abdominal pain, denies burning on urination or trouble with BMs, denies other PMH, appears otherwise comfortable. IV placed, labs sent, VS as documented, will endorse to primary RN.

## 2018-06-01 NOTE — ED PROVIDER NOTE - MEDICAL DECISION MAKING DETAILS
79 y/o F with PMH of LENCHO p/w nausea and fatigue for 1month. weightloss w/ constant nausea. pt, reports 5lbs weight loss in  1 month. no chest pain or fevers. r/o FTT  will likely need GI work up outpatient, cbc, cmp, tsh now

## 2018-06-02 VITALS
OXYGEN SATURATION: 100 % | SYSTOLIC BLOOD PRESSURE: 141 MMHG | RESPIRATION RATE: 16 BRPM | DIASTOLIC BLOOD PRESSURE: 68 MMHG | TEMPERATURE: 98 F | HEART RATE: 64 BPM

## 2018-06-02 LAB
ALBUMIN SERPL ELPH-MCNC: 4 G/DL — SIGNIFICANT CHANGE UP (ref 3.3–5)
ALP SERPL-CCNC: 65 U/L — SIGNIFICANT CHANGE UP (ref 40–120)
ALT FLD-CCNC: 11 U/L — SIGNIFICANT CHANGE UP (ref 4–33)
AST SERPL-CCNC: 18 U/L — SIGNIFICANT CHANGE UP (ref 4–32)
BILIRUB SERPL-MCNC: 0.2 MG/DL — SIGNIFICANT CHANGE UP (ref 0.2–1.2)
BUN SERPL-MCNC: 16 MG/DL — SIGNIFICANT CHANGE UP (ref 7–23)
CALCIUM SERPL-MCNC: 8.9 MG/DL — SIGNIFICANT CHANGE UP (ref 8.4–10.5)
CHLORIDE SERPL-SCNC: 100 MMOL/L — SIGNIFICANT CHANGE UP (ref 98–107)
CO2 SERPL-SCNC: 27 MMOL/L — SIGNIFICANT CHANGE UP (ref 22–31)
CREAT SERPL-MCNC: 0.66 MG/DL — SIGNIFICANT CHANGE UP (ref 0.5–1.3)
GLUCOSE SERPL-MCNC: 87 MG/DL — SIGNIFICANT CHANGE UP (ref 70–99)
LIDOCAIN IGE QN: 29.7 U/L — SIGNIFICANT CHANGE UP (ref 7–60)
MAGNESIUM SERPL-MCNC: 2.3 MG/DL — SIGNIFICANT CHANGE UP (ref 1.6–2.6)
PHOSPHATE SERPL-MCNC: 2.9 MG/DL — SIGNIFICANT CHANGE UP (ref 2.5–4.5)
POTASSIUM SERPL-MCNC: 4.3 MMOL/L — SIGNIFICANT CHANGE UP (ref 3.5–5.3)
POTASSIUM SERPL-SCNC: 4.3 MMOL/L — SIGNIFICANT CHANGE UP (ref 3.5–5.3)
PROT SERPL-MCNC: 7.3 G/DL — SIGNIFICANT CHANGE UP (ref 6–8.3)
SODIUM SERPL-SCNC: 139 MMOL/L — SIGNIFICANT CHANGE UP (ref 135–145)
TSH SERPL-MCNC: 4.65 UIU/ML — HIGH (ref 0.27–4.2)

## 2018-06-02 PROCEDURE — 71045 X-RAY EXAM CHEST 1 VIEW: CPT | Mod: 26

## 2018-06-02 PROCEDURE — 71250 CT THORAX DX C-: CPT | Mod: 26

## 2018-06-02 RX ORDER — CEPHALEXIN 500 MG
500 CAPSULE ORAL ONCE
Qty: 0 | Refills: 0 | Status: COMPLETED | OUTPATIENT
Start: 2018-06-02 | End: 2018-06-02

## 2018-06-02 RX ORDER — CEPHALEXIN 500 MG
1 CAPSULE ORAL
Qty: 10 | Refills: 0 | OUTPATIENT
Start: 2018-06-02 | End: 2018-06-06

## 2018-06-02 RX ORDER — CEPHALEXIN 500 MG
500 CAPSULE ORAL DAILY
Qty: 0 | Refills: 0 | Status: DISCONTINUED | OUTPATIENT
Start: 2018-06-02 | End: 2018-06-02

## 2018-06-02 RX ADMIN — Medication 500 MILLIGRAM(S): at 03:03

## 2018-06-03 LAB
BACTERIA UR CULT: SIGNIFICANT CHANGE UP
SPECIMEN SOURCE: SIGNIFICANT CHANGE UP

## 2018-06-11 ENCOUNTER — APPOINTMENT (OUTPATIENT)
Dept: INTERNAL MEDICINE | Facility: CLINIC | Age: 78
End: 2018-06-11
Payer: MEDICARE

## 2018-06-11 ENCOUNTER — LABORATORY RESULT (OUTPATIENT)
Age: 78
End: 2018-06-11

## 2018-06-11 VITALS
OXYGEN SATURATION: 97 % | DIASTOLIC BLOOD PRESSURE: 62 MMHG | HEART RATE: 62 BPM | BODY MASS INDEX: 18.95 KG/M2 | WEIGHT: 94 LBS | TEMPERATURE: 97.8 F | HEIGHT: 59 IN | SYSTOLIC BLOOD PRESSURE: 126 MMHG

## 2018-06-11 DIAGNOSIS — D64.9 ANEMIA, UNSPECIFIED: ICD-10-CM

## 2018-06-11 PROCEDURE — 99214 OFFICE O/P EST MOD 30 MIN: CPT

## 2018-06-11 NOTE — DATA REVIEWED
[FreeTextEntry1] : ER labs - elevated TSH noted\par Chest CT - multiple small nodule 6 month follow up recommended

## 2018-06-11 NOTE — REVIEW OF SYSTEMS
[Fatigue] : fatigue [Nocturia] : nocturia [Frequency] : frequency [Negative] : Musculoskeletal [Fever] : no fever [Chills] : no chills [Night Sweats] : no night sweats [Dysuria] : no dysuria [Incontinence] : no incontinence [Hematuria] : no hematuria

## 2018-06-11 NOTE — ASSESSMENT
[FreeTextEntry1] : \par #1 UTI\par Completed treatment with Keflex\par Feeling better\par Repeat UA with reflex\par \par #2 Urinary frequency\par Continue oxybutynin\par Requisitions for pelvic ultrasound and urology referral printed, phone numbers provided\par \par #3 Fatigue\par Recheck TSH, CBC, B12\par Symptoms started with recent illness, if continues may consider repeat echo given h/o mitral and atrial regurg\par \par #4 Lung nodules\par Repeat CT in 6 months\par \par PCP follow up scheduled for 3 weeks

## 2018-06-11 NOTE — HISTORY OF PRESENT ILLNESS
[FreeTextEntry1] : ER follow up [de-identified] : Presented to ED about 10 days ago with c/o weakness, nausea, vomiting.  Found to have UTI, treated with Keflex.  \par Symptoms mostly resolved, just feeling fatigued since initially having a viral illness last month and then the recent symptoms that brought her to the ER.  .  Continues to have urinary frequency as discussed with PCP at last visit.  Start oxybutynin as prescribed, but does not notice any improvement.  Denies any hematuria, abdominal/back pain.   Did not realize pelvic US or urology evaluation recommended at last visit, did not schedule.  \par

## 2018-06-11 NOTE — PHYSICAL EXAM
[No Acute Distress] : no acute distress [Well-Appearing] : well-appearing [No Respiratory Distress] : no respiratory distress  [Clear to Auscultation] : lungs were clear to auscultation bilaterally [No Accessory Muscle Use] : no accessory muscle use [Normal Rate] : normal rate  [Regular Rhythm] : with a regular rhythm [Normal S1, S2] : normal S1 and S2 [Pedal Pulses Present] : the pedal pulses are present [No Edema] : there was no peripheral edema [Soft] : abdomen soft [Non Tender] : non-tender [Non-distended] : non-distended [No Masses] : no abdominal mass palpated [No HSM] : no HSM [Normal Bowel Sounds] : normal bowel sounds [Normal Supraclavicular Nodes] : no supraclavicular lymphadenopathy [Normal Posterior Cervical Nodes] : no posterior cervical lymphadenopathy [Normal Anterior Cervical Nodes] : no anterior cervical lymphadenopathy [No CVA Tenderness] : no CVA  tenderness [Normal Affect] : the affect was normal [Alert and Oriented x3] : oriented to person, place, and time [Normal Insight/Judgement] : insight and judgment were intact

## 2018-06-12 LAB
APPEARANCE: CLEAR
BASOPHILS # BLD AUTO: 0.01 K/UL
BASOPHILS NFR BLD AUTO: 0.2 %
BILIRUBIN URINE: NEGATIVE
BLOOD URINE: NEGATIVE
COLOR: YELLOW
EOSINOPHIL # BLD AUTO: 0.04 K/UL
EOSINOPHIL NFR BLD AUTO: 0.6 %
GLUCOSE QUALITATIVE U: NEGATIVE MG/DL
HCT VFR BLD CALC: 34.8 %
HGB BLD-MCNC: 11.8 G/DL
IMM GRANULOCYTES NFR BLD AUTO: 0.2 %
KETONES URINE: NEGATIVE
LEUKOCYTE ESTERASE URINE: ABNORMAL
LYMPHOCYTES # BLD AUTO: 1.97 K/UL
LYMPHOCYTES NFR BLD AUTO: 31.9 %
MAN DIFF?: NORMAL
MCHC RBC-ENTMCNC: 30.6 PG
MCHC RBC-ENTMCNC: 33.9 GM/DL
MCV RBC AUTO: 90.2 FL
MONOCYTES # BLD AUTO: 0.55 K/UL
MONOCYTES NFR BLD AUTO: 8.9 %
NEUTROPHILS # BLD AUTO: 3.59 K/UL
NEUTROPHILS NFR BLD AUTO: 58.2 %
NITRITE URINE: NEGATIVE
PH URINE: 6
PLATELET # BLD AUTO: 257 K/UL
PROTEIN URINE: NEGATIVE MG/DL
RBC # BLD: 3.86 M/UL
RBC # FLD: 13 %
SPECIFIC GRAVITY URINE: 1.01
TSH SERPL-ACNC: 2.16 UIU/ML
UROBILINOGEN URINE: NEGATIVE MG/DL
VIT B12 SERPL-MCNC: 1456 PG/ML
WBC # FLD AUTO: 6.17 K/UL

## 2018-06-13 ENCOUNTER — RX RENEWAL (OUTPATIENT)
Age: 78
End: 2018-06-13

## 2018-06-13 RX ORDER — MULTIVIT-MIN/FOLIC/VIT K/LYCOP 400-300MCG
25 MCG TABLET ORAL
Qty: 90 | Refills: 0 | Status: ACTIVE | COMMUNITY
Start: 2017-09-14 | End: 1900-01-01

## 2018-06-18 ENCOUNTER — RX RENEWAL (OUTPATIENT)
Age: 78
End: 2018-06-18

## 2018-06-21 ENCOUNTER — FORM ENCOUNTER (OUTPATIENT)
Age: 78
End: 2018-06-21

## 2018-06-22 ENCOUNTER — APPOINTMENT (OUTPATIENT)
Dept: ULTRASOUND IMAGING | Facility: IMAGING CENTER | Age: 78
End: 2018-06-22
Payer: MEDICARE

## 2018-06-22 ENCOUNTER — OUTPATIENT (OUTPATIENT)
Dept: OUTPATIENT SERVICES | Facility: HOSPITAL | Age: 78
LOS: 1 days | End: 2018-06-22
Payer: COMMERCIAL

## 2018-06-22 DIAGNOSIS — Z90.710 ACQUIRED ABSENCE OF BOTH CERVIX AND UTERUS: Chronic | ICD-10-CM

## 2018-06-22 DIAGNOSIS — R35.0 FREQUENCY OF MICTURITION: ICD-10-CM

## 2018-06-22 PROCEDURE — 76856 US EXAM PELVIC COMPLETE: CPT | Mod: 26

## 2018-06-22 PROCEDURE — 76856 US EXAM PELVIC COMPLETE: CPT

## 2018-07-02 ENCOUNTER — APPOINTMENT (OUTPATIENT)
Dept: UROLOGY | Facility: CLINIC | Age: 78
End: 2018-07-02
Payer: MEDICARE

## 2018-07-02 ENCOUNTER — OTHER (OUTPATIENT)
Age: 78
End: 2018-07-02

## 2018-07-02 ENCOUNTER — APPOINTMENT (OUTPATIENT)
Dept: INTERNAL MEDICINE | Facility: CLINIC | Age: 78
End: 2018-07-02
Payer: MEDICARE

## 2018-07-02 VITALS
DIASTOLIC BLOOD PRESSURE: 60 MMHG | TEMPERATURE: 98.3 F | OXYGEN SATURATION: 97 % | HEIGHT: 59 IN | BODY MASS INDEX: 19.35 KG/M2 | HEART RATE: 72 BPM | SYSTOLIC BLOOD PRESSURE: 124 MMHG | WEIGHT: 96 LBS

## 2018-07-02 PROCEDURE — 99204 OFFICE O/P NEW MOD 45 MIN: CPT | Mod: 25

## 2018-07-02 PROCEDURE — 51798 US URINE CAPACITY MEASURE: CPT

## 2018-07-02 PROCEDURE — 99213 OFFICE O/P EST LOW 20 MIN: CPT

## 2018-07-02 RX ORDER — CIPROFLOXACIN HYDROCHLORIDE 250 MG/1
250 TABLET, FILM COATED ORAL
Qty: 6 | Refills: 0 | Status: DISCONTINUED | COMMUNITY
Start: 2018-06-14 | End: 2018-07-02

## 2018-07-02 NOTE — HISTORY OF PRESENT ILLNESS
[FreeTextEntry1] : pt is here for f/u on the urinary frequency \par the UA/ C.S done in office that day were negative, US pelvis - no mass\par she later developed a UTI for which she was treated \par also the ED did a CXR that showed suspicion of  a spiculated mass , subsequent CT showed no mass \par f/u CT In 6 month adv \par \par pt ct to have frequency of urination

## 2018-07-02 NOTE — PHYSICAL EXAM
[No Acute Distress] : no acute distress [Well Nourished] : well nourished [No Respiratory Distress] : no respiratory distress  [Clear to Auscultation] : lungs were clear to auscultation bilaterally [Normal Rate] : normal rate  [Regular Rhythm] : with a regular rhythm [Soft] : abdomen soft [Non Tender] : non-tender [No HSM] : no HSM [Normal Bowel Sounds] : normal bowel sounds

## 2018-07-02 NOTE — ASSESSMENT
[FreeTextEntry1] : # urinary frequency \par - switch the oxybutynin to Mybetriq \par - urology f/u \par \par # f/u chest CT in 6-12 mo

## 2018-07-16 ENCOUNTER — FORM ENCOUNTER (OUTPATIENT)
Age: 78
End: 2018-07-16

## 2018-07-17 ENCOUNTER — OUTPATIENT (OUTPATIENT)
Dept: OUTPATIENT SERVICES | Facility: HOSPITAL | Age: 78
LOS: 1 days | End: 2018-07-17
Payer: COMMERCIAL

## 2018-07-17 ENCOUNTER — APPOINTMENT (OUTPATIENT)
Dept: ULTRASOUND IMAGING | Facility: IMAGING CENTER | Age: 78
End: 2018-07-17
Payer: MEDICARE

## 2018-07-17 DIAGNOSIS — Z90.710 ACQUIRED ABSENCE OF BOTH CERVIX AND UTERUS: Chronic | ICD-10-CM

## 2018-07-17 DIAGNOSIS — N39.0 URINARY TRACT INFECTION, SITE NOT SPECIFIED: ICD-10-CM

## 2018-07-17 PROCEDURE — 76770 US EXAM ABDO BACK WALL COMP: CPT | Mod: 26

## 2018-07-17 PROCEDURE — 76770 US EXAM ABDO BACK WALL COMP: CPT

## 2018-07-18 ENCOUNTER — APPOINTMENT (OUTPATIENT)
Dept: UROLOGY | Facility: CLINIC | Age: 78
End: 2018-07-18
Payer: MEDICARE

## 2018-07-18 ENCOUNTER — OUTPATIENT (OUTPATIENT)
Dept: OUTPATIENT SERVICES | Facility: HOSPITAL | Age: 78
LOS: 1 days | End: 2018-07-18
Payer: COMMERCIAL

## 2018-07-18 DIAGNOSIS — R35.0 FREQUENCY OF MICTURITION: ICD-10-CM

## 2018-07-18 DIAGNOSIS — Z90.710 ACQUIRED ABSENCE OF BOTH CERVIX AND UTERUS: Chronic | ICD-10-CM

## 2018-07-18 PROCEDURE — 52000 CYSTOURETHROSCOPY: CPT

## 2018-07-24 DIAGNOSIS — N39.0 URINARY TRACT INFECTION, SITE NOT SPECIFIED: ICD-10-CM

## 2018-07-31 ENCOUNTER — RX RENEWAL (OUTPATIENT)
Age: 78
End: 2018-07-31

## 2018-08-20 ENCOUNTER — RESULT REVIEW (OUTPATIENT)
Age: 78
End: 2018-08-20

## 2018-08-20 LAB
25(OH)D3 SERPL-MCNC: 42.3 NG/ML
ALBUMIN SERPL ELPH-MCNC: 4.4 G/DL
ALP BLD-CCNC: 71 U/L
ALT SERPL-CCNC: 19 U/L
ANION GAP SERPL CALC-SCNC: 14 MMOL/L
AST SERPL-CCNC: 30 U/L
BILIRUB SERPL-MCNC: 0.4 MG/DL
BUN SERPL-MCNC: 10 MG/DL
CALCIUM SERPL-MCNC: 9 MG/DL
CHLORIDE SERPL-SCNC: 98 MMOL/L
CO2 SERPL-SCNC: 23 MMOL/L
CREAT SERPL-MCNC: 0.8 MG/DL
GLUCOSE SERPL-MCNC: 83 MG/DL
HBA1C MFR BLD HPLC: 5.6 %
MAGNESIUM SERPL-MCNC: 2.4 MG/DL
POTASSIUM SERPL-SCNC: 4.5 MMOL/L
PROT SERPL-MCNC: 7.9 G/DL
SODIUM SERPL-SCNC: 135 MMOL/L

## 2018-08-29 ENCOUNTER — RX RENEWAL (OUTPATIENT)
Age: 78
End: 2018-08-29

## 2018-09-06 ENCOUNTER — RX RENEWAL (OUTPATIENT)
Age: 78
End: 2018-09-06

## 2018-09-06 LAB
APPEARANCE: CLEAR
BACTERIA UR CULT: NORMAL
BACTERIA: NEGATIVE
BILIRUBIN URINE: NEGATIVE
BLOOD URINE: NEGATIVE
COLOR: YELLOW
GLUCOSE QUALITATIVE U: NEGATIVE MG/DL
KETONES URINE: NEGATIVE
LEUKOCYTE ESTERASE URINE: NEGATIVE
MICROSCOPIC-UA: NORMAL
NITRITE URINE: NEGATIVE
PH URINE: 6
PROTEIN URINE: NEGATIVE MG/DL
RED BLOOD CELLS URINE: 1 /HPF
SPECIFIC GRAVITY URINE: 1.01
SQUAMOUS EPITHELIAL CELLS: 0 /HPF
UROBILINOGEN URINE: NEGATIVE MG/DL
WHITE BLOOD CELLS URINE: 4 /HPF

## 2018-09-11 ENCOUNTER — APPOINTMENT (OUTPATIENT)
Dept: ENDOCRINOLOGY | Facility: CLINIC | Age: 78
End: 2018-09-11
Payer: MEDICARE

## 2018-09-11 VITALS
HEART RATE: 78 BPM | TEMPERATURE: 98.8 F | HEIGHT: 59 IN | WEIGHT: 95 LBS | SYSTOLIC BLOOD PRESSURE: 138 MMHG | DIASTOLIC BLOOD PRESSURE: 75 MMHG | BODY MASS INDEX: 19.15 KG/M2

## 2018-09-11 PROCEDURE — 99214 OFFICE O/P EST MOD 30 MIN: CPT | Mod: 25

## 2018-09-11 PROCEDURE — 96401 CHEMO ANTI-NEOPL SQ/IM: CPT

## 2018-09-11 RX ORDER — ALENDRONATE SODIUM 70 MG/1
70 TABLET ORAL
Qty: 12 | Refills: 0 | Status: DISCONTINUED | COMMUNITY
Start: 2017-05-24 | End: 2018-09-11

## 2018-09-11 RX ORDER — CYANOCOBALAMIN (VITAMIN B-12) 100 MCG
100 TABLET ORAL DAILY
Refills: 0 | Status: DISCONTINUED | COMMUNITY
Start: 2018-02-07 | End: 2018-09-11

## 2018-09-12 ENCOUNTER — EMERGENCY (EMERGENCY)
Facility: HOSPITAL | Age: 78
LOS: 1 days | Discharge: HOME | End: 2018-09-12
Attending: EMERGENCY MEDICINE | Admitting: EMERGENCY MEDICINE
Payer: MEDICARE

## 2018-09-12 VITALS
SYSTOLIC BLOOD PRESSURE: 153 MMHG | TEMPERATURE: 98 F | OXYGEN SATURATION: 100 % | HEART RATE: 87 BPM | DIASTOLIC BLOOD PRESSURE: 77 MMHG | RESPIRATION RATE: 16 BRPM

## 2018-09-12 VITALS
RESPIRATION RATE: 16 BRPM | OXYGEN SATURATION: 100 % | DIASTOLIC BLOOD PRESSURE: 77 MMHG | SYSTOLIC BLOOD PRESSURE: 124 MMHG | HEART RATE: 72 BPM

## 2018-09-12 DIAGNOSIS — Z90.710 ACQUIRED ABSENCE OF BOTH CERVIX AND UTERUS: Chronic | ICD-10-CM

## 2018-09-12 LAB
ALBUMIN SERPL ELPH-MCNC: 3.9 G/DL — SIGNIFICANT CHANGE UP (ref 3.3–5)
ALP SERPL-CCNC: 71 U/L — SIGNIFICANT CHANGE UP (ref 40–120)
ALT FLD-CCNC: 20 U/L — SIGNIFICANT CHANGE UP (ref 4–33)
APPEARANCE UR: CLEAR — SIGNIFICANT CHANGE UP
AST SERPL-CCNC: 39 U/L — HIGH (ref 4–32)
BACTERIA # UR AUTO: NEGATIVE — SIGNIFICANT CHANGE UP
BASE EXCESS BLDV CALC-SCNC: 1.1 MMOL/L — SIGNIFICANT CHANGE UP
BASOPHILS # BLD AUTO: 0.02 K/UL — SIGNIFICANT CHANGE UP (ref 0–0.2)
BASOPHILS NFR BLD AUTO: 0.2 % — SIGNIFICANT CHANGE UP (ref 0–2)
BILIRUB SERPL-MCNC: 0.2 MG/DL — SIGNIFICANT CHANGE UP (ref 0.2–1.2)
BILIRUB UR-MCNC: NEGATIVE — SIGNIFICANT CHANGE UP
BLOOD GAS VENOUS - CREATININE: 0.64 MG/DL — SIGNIFICANT CHANGE UP (ref 0.5–1.3)
BLOOD UR QL VISUAL: NEGATIVE — SIGNIFICANT CHANGE UP
BUN SERPL-MCNC: 18 MG/DL — SIGNIFICANT CHANGE UP (ref 7–23)
CALCIUM SERPL-MCNC: 9 MG/DL — SIGNIFICANT CHANGE UP (ref 8.4–10.5)
CHLORIDE BLDV-SCNC: 98 MMOL/L — SIGNIFICANT CHANGE UP (ref 96–108)
CHLORIDE SERPL-SCNC: 94 MMOL/L — LOW (ref 98–107)
CO2 SERPL-SCNC: 22 MMOL/L — SIGNIFICANT CHANGE UP (ref 22–31)
COLOR SPEC: SIGNIFICANT CHANGE UP
CREAT SERPL-MCNC: 0.62 MG/DL — SIGNIFICANT CHANGE UP (ref 0.5–1.3)
EOSINOPHIL # BLD AUTO: 0.1 K/UL — SIGNIFICANT CHANGE UP (ref 0–0.5)
EOSINOPHIL NFR BLD AUTO: 1 % — SIGNIFICANT CHANGE UP (ref 0–6)
GAS PNL BLDV: 128 MMOL/L — LOW (ref 136–146)
GLUCOSE BLDV-MCNC: 111 — HIGH (ref 70–99)
GLUCOSE SERPL-MCNC: 106 MG/DL — HIGH (ref 70–99)
GLUCOSE UR-MCNC: NEGATIVE — SIGNIFICANT CHANGE UP
HCO3 BLDV-SCNC: 24 MMOL/L — SIGNIFICANT CHANGE UP (ref 20–27)
HCT VFR BLD CALC: 33.1 % — LOW (ref 34.5–45)
HCT VFR BLDV CALC: 27.7 % — LOW (ref 34.5–45)
HGB BLD-MCNC: 11.3 G/DL — LOW (ref 11.5–15.5)
HGB BLDV-MCNC: 8.9 G/DL — LOW (ref 11.5–15.5)
HYALINE CASTS # UR AUTO: NEGATIVE — SIGNIFICANT CHANGE UP
IMM GRANULOCYTES # BLD AUTO: 0.02 # — SIGNIFICANT CHANGE UP
IMM GRANULOCYTES NFR BLD AUTO: 0.2 % — SIGNIFICANT CHANGE UP (ref 0–1.5)
KETONES UR-MCNC: NEGATIVE — SIGNIFICANT CHANGE UP
LACTATE BLDV-MCNC: 2 MMOL/L — SIGNIFICANT CHANGE UP (ref 0.5–2)
LEUKOCYTE ESTERASE UR-ACNC: SIGNIFICANT CHANGE UP
LIDOCAIN IGE QN: 35.3 U/L — SIGNIFICANT CHANGE UP (ref 7–60)
LYMPHOCYTES # BLD AUTO: 1.7 K/UL — SIGNIFICANT CHANGE UP (ref 1–3.3)
LYMPHOCYTES # BLD AUTO: 17.5 % — SIGNIFICANT CHANGE UP (ref 13–44)
MAGNESIUM SERPL-MCNC: 2 MG/DL — SIGNIFICANT CHANGE UP (ref 1.6–2.6)
MCHC RBC-ENTMCNC: 30.9 PG — SIGNIFICANT CHANGE UP (ref 27–34)
MCHC RBC-ENTMCNC: 34.1 % — SIGNIFICANT CHANGE UP (ref 32–36)
MCV RBC AUTO: 90.4 FL — SIGNIFICANT CHANGE UP (ref 80–100)
MONOCYTES # BLD AUTO: 0.74 K/UL — SIGNIFICANT CHANGE UP (ref 0–0.9)
MONOCYTES NFR BLD AUTO: 7.6 % — SIGNIFICANT CHANGE UP (ref 2–14)
NEUTROPHILS # BLD AUTO: 7.11 K/UL — SIGNIFICANT CHANGE UP (ref 1.8–7.4)
NEUTROPHILS NFR BLD AUTO: 73.5 % — SIGNIFICANT CHANGE UP (ref 43–77)
NITRITE UR-MCNC: NEGATIVE — SIGNIFICANT CHANGE UP
NRBC # FLD: 0 — SIGNIFICANT CHANGE UP
PCO2 BLDV: 52 MMHG — HIGH (ref 41–51)
PH BLDV: 7.33 PH — SIGNIFICANT CHANGE UP (ref 7.32–7.43)
PH UR: 6 — SIGNIFICANT CHANGE UP (ref 5–8)
PHOSPHATE SERPL-MCNC: 3.2 MG/DL — SIGNIFICANT CHANGE UP (ref 2.5–4.5)
PLATELET # BLD AUTO: 276 K/UL — SIGNIFICANT CHANGE UP (ref 150–400)
PMV BLD: 10 FL — SIGNIFICANT CHANGE UP (ref 7–13)
PO2 BLDV: 35 MMHG — SIGNIFICANT CHANGE UP (ref 35–40)
POTASSIUM BLDV-SCNC: 4.5 MMOL/L — SIGNIFICANT CHANGE UP (ref 3.4–4.5)
POTASSIUM SERPL-MCNC: 5 MMOL/L — SIGNIFICANT CHANGE UP (ref 3.5–5.3)
POTASSIUM SERPL-SCNC: 5 MMOL/L — SIGNIFICANT CHANGE UP (ref 3.5–5.3)
PROT SERPL-MCNC: 7.5 G/DL — SIGNIFICANT CHANGE UP (ref 6–8.3)
PROT UR-MCNC: NEGATIVE — SIGNIFICANT CHANGE UP
RBC # BLD: 3.66 M/UL — LOW (ref 3.8–5.2)
RBC # FLD: 12 % — SIGNIFICANT CHANGE UP (ref 10.3–14.5)
RBC CASTS # UR COMP ASSIST: SIGNIFICANT CHANGE UP (ref 0–?)
SAO2 % BLDV: 53.3 % — LOW (ref 60–85)
SODIUM SERPL-SCNC: 131 MMOL/L — LOW (ref 135–145)
SP GR SPEC: 1.01 — SIGNIFICANT CHANGE UP (ref 1–1.04)
SQUAMOUS # UR AUTO: SIGNIFICANT CHANGE UP
UROBILINOGEN FLD QL: NORMAL — SIGNIFICANT CHANGE UP
WBC # BLD: 9.69 K/UL — SIGNIFICANT CHANGE UP (ref 3.8–10.5)
WBC # FLD AUTO: 9.69 K/UL — SIGNIFICANT CHANGE UP (ref 3.8–10.5)
WBC UR QL: SIGNIFICANT CHANGE UP (ref 0–?)

## 2018-09-12 PROCEDURE — 93010 ELECTROCARDIOGRAM REPORT: CPT

## 2018-09-12 PROCEDURE — 99284 EMERGENCY DEPT VISIT MOD MDM: CPT | Mod: 25,GC

## 2018-09-12 RX ORDER — FAMOTIDINE 10 MG/ML
20 INJECTION INTRAVENOUS ONCE
Qty: 0 | Refills: 0 | Status: COMPLETED | OUTPATIENT
Start: 2018-09-12 | End: 2018-09-12

## 2018-09-12 RX ORDER — SODIUM CHLORIDE 9 MG/ML
500 INJECTION INTRAMUSCULAR; INTRAVENOUS; SUBCUTANEOUS ONCE
Qty: 0 | Refills: 0 | Status: COMPLETED | OUTPATIENT
Start: 2018-09-12 | End: 2018-09-12

## 2018-09-12 RX ORDER — ONDANSETRON 8 MG/1
4 TABLET, FILM COATED ORAL ONCE
Qty: 0 | Refills: 0 | Status: COMPLETED | OUTPATIENT
Start: 2018-09-12 | End: 2018-09-12

## 2018-09-12 RX ORDER — FAMOTIDINE 10 MG/ML
1 INJECTION INTRAVENOUS
Qty: 10 | Refills: 0 | OUTPATIENT
Start: 2018-09-12

## 2018-09-12 RX ADMIN — FAMOTIDINE 20 MILLIGRAM(S): 10 INJECTION INTRAVENOUS at 19:36

## 2018-09-12 RX ADMIN — Medication 30 MILLILITER(S): at 19:36

## 2018-09-12 RX ADMIN — ONDANSETRON 4 MILLIGRAM(S): 8 TABLET, FILM COATED ORAL at 19:37

## 2018-09-12 RX ADMIN — SODIUM CHLORIDE 500 MILLILITER(S): 9 INJECTION INTRAMUSCULAR; INTRAVENOUS; SUBCUTANEOUS at 19:37

## 2018-09-12 NOTE — ED PROVIDER NOTE - PROGRESS NOTE DETAILS
Patient feels better. Tolerating PO. Will DC home. Shyanne Bailey DO Dr. Chahal: Pt states feeling better. Tolerating PO. Sent Pepcid to pharmacy. Advised to avoid spicy and fried foods. Given GI f/u list.

## 2018-09-12 NOTE — ED ADULT NURSE NOTE - OBJECTIVE STATEMENT
Received pt. in room 7 alert and oriented x 4, vss. No c/o pain c/o nausea and vomiting. Pt. stated " I started having nausea last night and I vomited two times today". Medicated as ordered labs sent will continue to monitor.

## 2018-09-12 NOTE — ED PROVIDER NOTE - ATTENDING CONTRIBUTION TO CARE
Pt was seen and evaluated by me. Pt is a 77 y/o female with no significant PMHx presenting to the ED for nausea and vomiting X 1 day. Pt states yesterday having some nausea after eating and then had 2 episodes of vomiting today. Pt denies any fever, chills, SOB, chest pain, or abd pain. Denies any diarrhea or dysuria. Lungs CTA b/l. RRR. Abd soft, non-tender.

## 2018-09-12 NOTE — ED ADULT NURSE NOTE - NSIMPLEMENTINTERV_GEN_ALL_ED
Implemented All Universal Safety Interventions:  [place] to call system. Call bell, personal items and telephone within reach. Instruct patient to call for assistance. Room bathroom lighting operational. Non-slip footwear when patient is off stretcher. Physically safe environment: no spills, clutter or unnecessary equipment. Stretcher in lowest position, wheels locked, appropriate side rails in place.

## 2018-09-12 NOTE — ED PROVIDER NOTE - CARE PLAN
Principal Discharge DX:	Vomiting  Assessment and plan of treatment:	1. Follow up with your primary care physician within 2-3days for reevaluation.  2.  Return to the Emergency Department for worsening, progressive or any other concerning symptoms.

## 2018-09-12 NOTE — ED PROVIDER NOTE - MEDICAL DECISION MAKING DETAILS
79 y/o female with no significant PMHx presenting to the ED for nausea and vomiting X 1 day. Likely gastritis, r/o pancreatitis. Labs, Antiemetics, IVF.

## 2018-09-12 NOTE — ED PROVIDER NOTE - OBJECTIVE STATEMENT
77 y/o female with no significant PMHx presenting to the ED for nausea and vomiting X 1 day. Pt states yesterday having some nausea after eating and then had 2 episodes of vomiting today. Pt denies any fever, chills, SOB, chest pain, or abd pain 77 y/o female with no significant PMHx presenting to the ED for nausea and vomiting X 1 day. Pt states yesterday having some nausea after eating and then had 2 episodes of vomiting today. Pt denies any fever, chills, SOB, chest pain, or abd pain    Shyanne Polancoe DO: 77yo female with PMH hyperactive bladder p/w nausea and vomiting x 2 days, vomited twice this AM, NBNB. No abdominal pain. no fevers or chills, chest pain or shortness of breath. Patient had similar symptoms in May and diagnosed with UTI, states that she has similar symptoms today. No cardiac history. No diarrhea.

## 2018-09-12 NOTE — ED PROVIDER NOTE - PHYSICAL EXAMINATION
Shyanne Bailey DO:  Gen: NAD  Head: NCAT  HEENT: oral mucosa moist, normal conjunctiva  Lung: CTAB, no respiratory distress, no wheezing, rales, rhonchi  CV: normal s1/s2, rrr, no murmurs, Normal perfusion  Abd: soft, NTND, no CVA tenderness  MSK: No edema, no visible deformities, full range of motion in all 4 extremities  Neuro: No focal neurologic deficits  Skin: No rash

## 2018-09-14 LAB
BACTERIA UR CULT: SIGNIFICANT CHANGE UP
SPECIMEN SOURCE: SIGNIFICANT CHANGE UP

## 2018-10-27 ENCOUNTER — RX RENEWAL (OUTPATIENT)
Age: 78
End: 2018-10-27

## 2018-11-06 ENCOUNTER — RX RENEWAL (OUTPATIENT)
Age: 78
End: 2018-11-06

## 2018-11-29 ENCOUNTER — APPOINTMENT (OUTPATIENT)
Dept: INTERNAL MEDICINE | Facility: CLINIC | Age: 78
End: 2018-11-29
Payer: MEDICARE

## 2018-11-29 VITALS
HEIGHT: 59 IN | SYSTOLIC BLOOD PRESSURE: 150 MMHG | WEIGHT: 95 LBS | DIASTOLIC BLOOD PRESSURE: 76 MMHG | OXYGEN SATURATION: 98 % | HEART RATE: 100 BPM | TEMPERATURE: 98 F | BODY MASS INDEX: 19.15 KG/M2

## 2018-11-29 DIAGNOSIS — R91.8 OTHER NONSPECIFIC ABNORMAL FINDING OF LUNG FIELD: ICD-10-CM

## 2018-11-29 PROCEDURE — G0008: CPT

## 2018-11-29 PROCEDURE — 90662 IIV NO PRSV INCREASED AG IM: CPT

## 2018-11-29 PROCEDURE — G0439: CPT | Mod: 25

## 2018-11-29 PROCEDURE — 36415 COLL VENOUS BLD VENIPUNCTURE: CPT

## 2018-11-29 NOTE — PHYSICAL EXAM
[No Acute Distress] : no acute distress [Well-Appearing] : well-appearing [Normal Sclera/Conjunctiva] : normal sclera/conjunctiva [PERRL] : pupils equal round and reactive to light [Normal Outer Ear/Nose] : the outer ears and nose were normal in appearance [Normal Oropharynx] : the oropharynx was normal [No JVD] : no jugular venous distention [Supple] : supple [No Lymphadenopathy] : no lymphadenopathy [No Respiratory Distress] : no respiratory distress  [Clear to Auscultation] : lungs were clear to auscultation bilaterally [No Accessory Muscle Use] : no accessory muscle use [Normal Rate] : normal rate  [Regular Rhythm] : with a regular rhythm [Normal S1, S2] : normal S1 and S2 [No Varicosities] : no varicosities [No Edema] : there was no peripheral edema [No Extremity Clubbing/Cyanosis] : no extremity clubbing/cyanosis [Soft] : abdomen soft [Non Tender] : non-tender [No HSM] : no HSM [Normal Bowel Sounds] : normal bowel sounds [Normal Supraclavicular Nodes] : no supraclavicular lymphadenopathy [Normal Posterior Cervical Nodes] : no posterior cervical lymphadenopathy [Normal Anterior Cervical Nodes] : no anterior cervical lymphadenopathy [No CVA Tenderness] : no CVA  tenderness [No Spinal Tenderness] : no spinal tenderness [No Joint Swelling] : no joint swelling [Grossly Normal Strength/Tone] : grossly normal strength/tone [No Rash] : no rash [No Skin Lesions] : no skin lesions [Normal Gait] : normal gait [Coordination Grossly Intact] : coordination grossly intact [No Focal Deficits] : no focal deficits [Speech Grossly Normal] : speech grossly normal [Memory Grossly Normal] : memory grossly normal [Normal Mood] : the mood was normal [Normal Insight/Judgement] : insight and judgment were intact

## 2018-11-29 NOTE — HEALTH RISK ASSESSMENT
[Good] : ~his/her~  mood as  good [] : No [Any fall with injury in past year] : Patient reported fall with injury in the past year [0] : 2) Feeling down, depressed, or hopeless: Not at all (0) [Patient reported mammogram was normal] : Patient reported mammogram was normal [Patient reported bone density results were abnormal] : Patient reported bone density results were abnormal [Patient reported colonoscopy was normal] : Patient reported colonoscopy was normal [Change in mental status noted] : No change in mental status noted [None] : None [With Family] : lives with family [Unemployed] : unemployed [] :  [Sexually Active] : not sexually active [Feels Safe at Home] : Feels safe at home [Fully functional (bathing, dressing, toileting, transferring, walking, feeding)] : Fully functional (bathing, dressing, toileting, transferring, walking, feeding) [Fully functional (using the telephone, shopping, preparing meals, housekeeping, doing laundry, using] : Fully functional and needs no help or supervision to perform IADLs (using the telephone, shopping, preparing meals, housekeeping, doing laundry, using transportation, managing medications and managing finances) [Reports changes in hearing] : Reports changes in hearing [Reports changes in vision] : Reports changes in vision [Reports changes in dental health] : Reports changes in dental health [Smoke Detector] : smoke detector [Carbon Monoxide Detector] : carbon monoxide detector [Safety elements used in home] : safety elements used in home [Seat Belt] :  uses seat belt [MammogramDate] : 2017 [BoneDensityDate] : 2017 [BoneDensityComments] : OP- om Prolia [ColonoscopyDate] : 2014 [Discussed at today's visit] : Advance Directives Discussed at today's visit [Name: ___] : Health Care Proxy's Name: [unfilled]  [Relationship: ___] : Relationship: [unfilled]

## 2018-11-29 NOTE — REVIEW OF SYSTEMS
[Vision Problems] : vision problems [Hearing Loss] : hearing loss [Frequency] : frequency [Negative] : Psychiatric

## 2018-11-29 NOTE — ASSESSMENT
[FreeTextEntry1] : # wellness check \par \par - diet / exercise / fall prevention \par - check lipid \par - A1C - nl \par - needs mammo\par - ct meds for OP \par - UTD w/ colonoscopy by hx \par - needs flu shot and Shingrix \par \par # OAB \par - ct urology f/u \par \par #OP \par - on prolia\par - f/u endo \par \par # pulmo nodules on chest CT \par - f/u chest CT

## 2018-11-30 LAB
CHOLEST SERPL-MCNC: 238 MG/DL
CHOLEST/HDLC SERPL: 2.8 RATIO
HDLC SERPL-MCNC: 85 MG/DL
LDLC SERPL CALC-MCNC: 142 MG/DL
TRIGL SERPL-MCNC: 55 MG/DL

## 2018-12-05 ENCOUNTER — FORM ENCOUNTER (OUTPATIENT)
Age: 78
End: 2018-12-05

## 2018-12-06 ENCOUNTER — OUTPATIENT (OUTPATIENT)
Dept: OUTPATIENT SERVICES | Facility: HOSPITAL | Age: 78
LOS: 1 days | End: 2018-12-06
Payer: COMMERCIAL

## 2018-12-06 ENCOUNTER — APPOINTMENT (OUTPATIENT)
Dept: CT IMAGING | Facility: IMAGING CENTER | Age: 78
End: 2018-12-06

## 2018-12-06 ENCOUNTER — APPOINTMENT (OUTPATIENT)
Dept: MAMMOGRAPHY | Facility: IMAGING CENTER | Age: 78
End: 2018-12-06

## 2018-12-06 DIAGNOSIS — Z90.710 ACQUIRED ABSENCE OF BOTH CERVIX AND UTERUS: Chronic | ICD-10-CM

## 2018-12-06 DIAGNOSIS — Z00.8 ENCOUNTER FOR OTHER GENERAL EXAMINATION: ICD-10-CM

## 2018-12-06 PROCEDURE — 77063 BREAST TOMOSYNTHESIS BI: CPT | Mod: 26

## 2018-12-06 PROCEDURE — 77067 SCR MAMMO BI INCL CAD: CPT

## 2018-12-06 PROCEDURE — 77067 SCR MAMMO BI INCL CAD: CPT | Mod: 26

## 2018-12-06 PROCEDURE — 77063 BREAST TOMOSYNTHESIS BI: CPT

## 2018-12-12 ENCOUNTER — FORM ENCOUNTER (OUTPATIENT)
Age: 78
End: 2018-12-12

## 2018-12-13 ENCOUNTER — OUTPATIENT (OUTPATIENT)
Dept: OUTPATIENT SERVICES | Facility: HOSPITAL | Age: 78
LOS: 1 days | End: 2018-12-13
Payer: COMMERCIAL

## 2018-12-13 ENCOUNTER — APPOINTMENT (OUTPATIENT)
Dept: CT IMAGING | Facility: IMAGING CENTER | Age: 78
End: 2018-12-13
Payer: MEDICARE

## 2018-12-13 DIAGNOSIS — Z90.710 ACQUIRED ABSENCE OF BOTH CERVIX AND UTERUS: Chronic | ICD-10-CM

## 2018-12-13 DIAGNOSIS — Z00.8 ENCOUNTER FOR OTHER GENERAL EXAMINATION: ICD-10-CM

## 2018-12-13 PROCEDURE — 71250 CT THORAX DX C-: CPT

## 2018-12-13 PROCEDURE — 71250 CT THORAX DX C-: CPT | Mod: 26

## 2019-01-14 ENCOUNTER — APPOINTMENT (OUTPATIENT)
Dept: UROLOGY | Facility: CLINIC | Age: 79
End: 2019-01-14

## 2019-01-28 ENCOUNTER — MEDICATION RENEWAL (OUTPATIENT)
Age: 79
End: 2019-01-28

## 2019-02-27 ENCOUNTER — RX RENEWAL (OUTPATIENT)
Age: 79
End: 2019-02-27

## 2019-03-12 ENCOUNTER — OTHER (OUTPATIENT)
Age: 79
End: 2019-03-12

## 2019-04-22 ENCOUNTER — FORM ENCOUNTER (OUTPATIENT)
Age: 79
End: 2019-04-22

## 2019-04-23 ENCOUNTER — APPOINTMENT (OUTPATIENT)
Dept: RADIOLOGY | Facility: IMAGING CENTER | Age: 79
End: 2019-04-23
Payer: MEDICARE

## 2019-04-23 ENCOUNTER — OUTPATIENT (OUTPATIENT)
Dept: OUTPATIENT SERVICES | Facility: HOSPITAL | Age: 79
LOS: 1 days | End: 2019-04-23
Payer: COMMERCIAL

## 2019-04-23 DIAGNOSIS — M81.0 AGE-RELATED OSTEOPOROSIS WITHOUT CURRENT PATHOLOGICAL FRACTURE: ICD-10-CM

## 2019-04-23 DIAGNOSIS — Z90.710 ACQUIRED ABSENCE OF BOTH CERVIX AND UTERUS: Chronic | ICD-10-CM

## 2019-04-23 PROCEDURE — 77080 DXA BONE DENSITY AXIAL: CPT | Mod: 26

## 2019-04-23 PROCEDURE — 77080 DXA BONE DENSITY AXIAL: CPT

## 2019-05-06 LAB
24R-OH-CALCIDIOL SERPL-MCNC: 49.5 PG/ML
25(OH)D3 SERPL-MCNC: 41.8 NG/ML
ALBUMIN SERPL ELPH-MCNC: 4.4 G/DL
ALP BLD-CCNC: 71 U/L
ALT SERPL-CCNC: 19 U/L
ANION GAP SERPL CALC-SCNC: 11 MMOL/L
AST SERPL-CCNC: 25 U/L
BILIRUB SERPL-MCNC: 0.3 MG/DL
BUN SERPL-MCNC: 17 MG/DL
CALCIUM SERPL-MCNC: 9.3 MG/DL
CHLORIDE SERPL-SCNC: 99 MMOL/L
CO2 SERPL-SCNC: 26 MMOL/L
CREAT SERPL-MCNC: 0.67 MG/DL
GLUCOSE SERPL-MCNC: 93 MG/DL
HBA1C MFR BLD HPLC: 5.6 %
MAGNESIUM SERPL-MCNC: 2 MG/DL
PHOSPHATE SERPL-MCNC: 3.2 MG/DL
POTASSIUM SERPL-SCNC: 3.9 MMOL/L
PROT SERPL-MCNC: 7.1 G/DL
SODIUM SERPL-SCNC: 136 MMOL/L

## 2019-05-13 ENCOUNTER — APPOINTMENT (OUTPATIENT)
Dept: UROLOGY | Facility: CLINIC | Age: 79
End: 2019-05-13

## 2019-05-22 ENCOUNTER — RX RENEWAL (OUTPATIENT)
Age: 79
End: 2019-05-22

## 2019-05-28 ENCOUNTER — APPOINTMENT (OUTPATIENT)
Dept: ENDOCRINOLOGY | Facility: CLINIC | Age: 79
End: 2019-05-28
Payer: MEDICARE

## 2019-05-28 ENCOUNTER — APPOINTMENT (OUTPATIENT)
Dept: INTERNAL MEDICINE | Facility: CLINIC | Age: 79
End: 2019-05-28
Payer: MEDICARE

## 2019-05-28 VITALS
SYSTOLIC BLOOD PRESSURE: 135 MMHG | OXYGEN SATURATION: 98 % | DIASTOLIC BLOOD PRESSURE: 68 MMHG | WEIGHT: 96 LBS | BODY MASS INDEX: 19.35 KG/M2 | HEIGHT: 59 IN | HEART RATE: 79 BPM

## 2019-05-28 VITALS
HEART RATE: 75 BPM | HEIGHT: 59 IN | OXYGEN SATURATION: 98 % | DIASTOLIC BLOOD PRESSURE: 60 MMHG | BODY MASS INDEX: 20.16 KG/M2 | TEMPERATURE: 98.1 F | SYSTOLIC BLOOD PRESSURE: 120 MMHG | WEIGHT: 100 LBS

## 2019-05-28 PROCEDURE — 36415 COLL VENOUS BLD VENIPUNCTURE: CPT

## 2019-05-28 PROCEDURE — 96401 CHEMO ANTI-NEOPL SQ/IM: CPT

## 2019-05-28 PROCEDURE — 99214 OFFICE O/P EST MOD 30 MIN: CPT | Mod: 25

## 2019-05-28 PROCEDURE — 99213 OFFICE O/P EST LOW 20 MIN: CPT

## 2019-05-28 RX ORDER — MIRABEGRON 50 MG/1
50 TABLET, FILM COATED, EXTENDED RELEASE ORAL
Qty: 30 | Refills: 1 | Status: DISCONTINUED | COMMUNITY
Start: 2018-07-02 | End: 2019-05-28

## 2019-05-28 RX ORDER — CHLORHEXIDINE GLUCONATE 4 %
325 (65 FE) LIQUID (ML) TOPICAL DAILY
Refills: 0 | Status: DISCONTINUED | COMMUNITY
Start: 2018-09-11 | End: 2019-05-28

## 2019-05-28 NOTE — PHYSICAL EXAM
[No Acute Distress] : no acute distress [No JVD] : no jugular venous distention [Supple] : supple [No Lymphadenopathy] : no lymphadenopathy [No Respiratory Distress] : no respiratory distress  [Clear to Auscultation] : lungs were clear to auscultation bilaterally [No Accessory Muscle Use] : no accessory muscle use [Normal Rate] : normal rate  [No Edema] : there was no peripheral edema [Regular Rhythm] : with a regular rhythm [Normal S1, S2] : normal S1 and S2 [Soft] : abdomen soft [Non Tender] : non-tender [No HSM] : no HSM [Normal Bowel Sounds] : normal bowel sounds

## 2019-05-28 NOTE — ASSESSMENT
[FreeTextEntry1] : 1) HL \par - ct meds \par - check lipid panel at next visit \par \par 2) OAB \par - well controlled \par - ct meds \par \par 3) OP \par - on Prolia\par - no falls \par - home safety discussed\par  patient

## 2019-05-28 NOTE — HISTORY OF PRESENT ILLNESS
[FreeTextEntry1] : follow up on HL / OP / OAB \par \par taking all meds \par no AE \par no falls \par no chest pain or SOB \par \par Last \par

## 2019-05-29 LAB
THYROGLOB AB SERPL-ACNC: <20 IU/ML
THYROPEROXIDASE AB SERPL IA-ACNC: 10.9 IU/ML
TSH SERPL-ACNC: 1.3 UIU/ML

## 2019-05-29 NOTE — HISTORY OF PRESENT ILLNESS
[FreeTextEntry1] : 80 yo F with PMH osteoporosis, prediabetes, HLD, iatrogenic menopause \par \par Was placed on fosamax approximately 7 years ago for presumed initial diagnosis of osteoporosis.\par She had a LENCHO BSO at age 37  with subsequent menopause. It was performed due to fibroids. \par She has been compliant with the medication. \par Sustained R posterior 6th rib fx 8/2017 after falling out of bed.\par no further fractures\par her mother did not have a hip fx\par never smoked\par no prolonged steroid use\par denies history of GERD\par DEXA 11/24/17 revealed osteopenia of the femoral neck with T score of -1.8, osteopenia of the total hip - 1.4, and osteoporosis of the lumbar spine with t score of -3, and osteoporosis of the distal 1/3 of the wrist with T score of  -2.8\par She received initial injection of prolia 3/14/18 - well-tolerated. She presents today for her 3rd injection\par DEXA 4/23/19 showed improvement in the distal 1/3 of the wrist to -2.1 and improvement of the spine to -2.7. Osteopenia of the hip noted. \par She takes calcium 600 mg BID and vitamin D 1000 IU QD\par no invasive dental work planned\par

## 2019-05-29 NOTE — ASSESSMENT
[FreeTextEntry1] : 78 yo F with PMH osteoporosis, prediabetes, HLD, iatrogenic menopause \par \par 1. Osteoporosis - previously on fosamax. will give 3rd injection of prolia today. Continue calcium 600 mg BID and vitamin D 1000 iu qd.  Repeat DEXA one year post initiation of prolia as above. \par \par 2. Prediabetes - HbA1c last within normal limits Lifestyle modification\par \par RV for prolia injection 6 months

## 2019-05-31 ENCOUNTER — APPOINTMENT (OUTPATIENT)
Dept: UROLOGY | Facility: CLINIC | Age: 79
End: 2019-05-31
Payer: MEDICARE

## 2019-05-31 DIAGNOSIS — Z87.440 PERSONAL HISTORY OF URINARY (TRACT) INFECTIONS: ICD-10-CM

## 2019-05-31 PROCEDURE — 99213 OFFICE O/P EST LOW 20 MIN: CPT

## 2019-05-31 NOTE — ASSESSMENT
[FreeTextEntry1] : UTIs in the past now without issues for a year. \par \par OAB well controlled with meds\par \par rtc prn

## 2019-05-31 NOTE — HISTORY OF PRESENT ILLNESS
[FreeTextEntry1] : 80yo female with cc of UTI. Pt was seen by Dr Gonzalez last year for acute change in sx. She was treated with keflex and cipro. Had urine tests and US and cysto that were normal. SHe returns today without complaints for "follow-up". \par \par At baseline, has some urinary frequency. Mostly 1-2 times per night. Occasional 3x. Has some daytime frequency but this is mostly dependent on fluid intake. Had urgency in the past but none recently. No straining or feelings of incomplete emptying. Has been on oxybutynin with above benefits. On this for a year or so.

## 2019-06-05 LAB
T4 FREE SERPL-MCNC: 1.2 NG/DL
THYROGLOB AB SERPL-ACNC: <20 IU/ML
THYROPEROXIDASE AB SERPL IA-ACNC: <10 IU/ML
TSH SERPL-ACNC: 1.27 UIU/ML

## 2019-06-25 ENCOUNTER — FORM ENCOUNTER (OUTPATIENT)
Age: 79
End: 2019-06-25

## 2019-06-26 ENCOUNTER — APPOINTMENT (OUTPATIENT)
Dept: ULTRASOUND IMAGING | Facility: IMAGING CENTER | Age: 79
End: 2019-06-26
Payer: MEDICARE

## 2019-06-26 ENCOUNTER — OUTPATIENT (OUTPATIENT)
Dept: OUTPATIENT SERVICES | Facility: HOSPITAL | Age: 79
LOS: 1 days | End: 2019-06-26
Payer: COMMERCIAL

## 2019-06-26 DIAGNOSIS — M81.0 AGE-RELATED OSTEOPOROSIS WITHOUT CURRENT PATHOLOGICAL FRACTURE: ICD-10-CM

## 2019-06-26 DIAGNOSIS — Z90.710 ACQUIRED ABSENCE OF BOTH CERVIX AND UTERUS: Chronic | ICD-10-CM

## 2019-06-26 PROCEDURE — 76536 US EXAM OF HEAD AND NECK: CPT

## 2019-06-26 PROCEDURE — 76536 US EXAM OF HEAD AND NECK: CPT | Mod: 26

## 2019-07-05 ENCOUNTER — OTHER (OUTPATIENT)
Age: 79
End: 2019-07-05

## 2019-07-10 ENCOUNTER — MEDICATION RENEWAL (OUTPATIENT)
Age: 79
End: 2019-07-10

## 2019-07-10 ENCOUNTER — APPOINTMENT (OUTPATIENT)
Dept: INTERNAL MEDICINE | Facility: CLINIC | Age: 79
End: 2019-07-10
Payer: MEDICARE

## 2019-07-10 VITALS
BODY MASS INDEX: 20.16 KG/M2 | WEIGHT: 100 LBS | HEART RATE: 95 BPM | SYSTOLIC BLOOD PRESSURE: 134 MMHG | OXYGEN SATURATION: 98 % | TEMPERATURE: 98.1 F | HEIGHT: 59 IN | DIASTOLIC BLOOD PRESSURE: 64 MMHG

## 2019-07-10 PROCEDURE — 99214 OFFICE O/P EST MOD 30 MIN: CPT

## 2019-07-10 NOTE — ASSESSMENT
[FreeTextEntry1] : memory loss\par - MMSE 16/30 \par - check TSH B12 RPR \par - MRI brain ,\par - neuro eval

## 2019-07-10 NOTE — PHYSICAL EXAM
[No Acute Distress] : no acute distress [Well Nourished] : well nourished [Well Developed] : well developed [No Accessory Muscle Use] : no accessory muscle use [No Respiratory Distress] : no respiratory distress  [Normal Rate] : normal rate  [Regular Rhythm] : with a regular rhythm [Clear to Auscultation] : lungs were clear to auscultation bilaterally [Normal S1, S2] : normal S1 and S2 [No Edema] : there was no peripheral edema [Non Tender] : non-tender [No HSM] : no HSM [Soft] : abdomen soft [Normal Bowel Sounds] : normal bowel sounds [Coordination Grossly Intact] : coordination grossly intact [No Focal Deficits] : no focal deficits [de-identified] : 6 item cog impairment test -score 6 , MMSE 16/30

## 2019-07-10 NOTE — HISTORY OF PRESENT ILLNESS
[FreeTextEntry1] : pt is here accompanied by her daughter who feels that her memory is getting worse over the last 1 year \par cannot remember where she put things or what she was doing a few hours ago \par no spatial confusion \par has left the stove on , the faucet running etc \par having issues with remembering recipes of foods she has made for many years \par no head trauma \par not a vegetarian

## 2019-07-20 ENCOUNTER — FORM ENCOUNTER (OUTPATIENT)
Age: 79
End: 2019-07-20

## 2019-07-21 ENCOUNTER — OUTPATIENT (OUTPATIENT)
Dept: OUTPATIENT SERVICES | Facility: HOSPITAL | Age: 79
LOS: 1 days | End: 2019-07-21
Payer: COMMERCIAL

## 2019-07-21 ENCOUNTER — APPOINTMENT (OUTPATIENT)
Dept: MRI IMAGING | Facility: IMAGING CENTER | Age: 79
End: 2019-07-21
Payer: MEDICARE

## 2019-07-21 DIAGNOSIS — Z90.710 ACQUIRED ABSENCE OF BOTH CERVIX AND UTERUS: Chronic | ICD-10-CM

## 2019-07-21 DIAGNOSIS — Z00.8 ENCOUNTER FOR OTHER GENERAL EXAMINATION: ICD-10-CM

## 2019-07-21 PROCEDURE — 70551 MRI BRAIN STEM W/O DYE: CPT

## 2019-07-21 PROCEDURE — 70551 MRI BRAIN STEM W/O DYE: CPT | Mod: 26

## 2019-07-22 ENCOUNTER — APPOINTMENT (OUTPATIENT)
Dept: ENDOCRINOLOGY | Facility: CLINIC | Age: 79
End: 2019-07-22

## 2019-07-22 VITALS
DIASTOLIC BLOOD PRESSURE: 70 MMHG | HEART RATE: 67 BPM | HEIGHT: 59 IN | TEMPERATURE: 98 F | OXYGEN SATURATION: 93 % | BODY MASS INDEX: 20.36 KG/M2 | WEIGHT: 101 LBS | SYSTOLIC BLOOD PRESSURE: 122 MMHG

## 2019-08-06 ENCOUNTER — RX RENEWAL (OUTPATIENT)
Age: 79
End: 2019-08-06

## 2019-10-17 ENCOUNTER — LABORATORY RESULT (OUTPATIENT)
Age: 79
End: 2019-10-17

## 2019-10-17 ENCOUNTER — APPOINTMENT (OUTPATIENT)
Dept: INTERNAL MEDICINE | Facility: CLINIC | Age: 79
End: 2019-10-17
Payer: MEDICARE

## 2019-10-17 VITALS
SYSTOLIC BLOOD PRESSURE: 90 MMHG | TEMPERATURE: 97.9 F | DIASTOLIC BLOOD PRESSURE: 60 MMHG | WEIGHT: 107 LBS | OXYGEN SATURATION: 98 % | HEIGHT: 58 IN | BODY MASS INDEX: 22.46 KG/M2 | HEART RATE: 70 BPM

## 2019-10-17 PROCEDURE — 99214 OFFICE O/P EST MOD 30 MIN: CPT

## 2019-10-17 NOTE — ASSESSMENT
[FreeTextEntry1] : 1) memory loss\par - MRI shows changes possibly 2/2 to microvascular disease \par - TSH - nl \par - condition is worsening \par - needs help with IADL like travelling\par - check RPR / B12, follow w/ neuro  11/8/19 \par \par 2) HLD\par - ct meds \par - stable\par - check ALT \par \par 3) OP\par - no falls, no fx \par - ct meds\par - home safety and fall prevention discussed \par - taking calcium supplements\par \par 4) OAB\par - stable\par -ct meds \par \par 5) mammo - due

## 2019-10-17 NOTE — HISTORY OF PRESENT ILLNESS
[FreeTextEntry1] : f/u HL / memory loss/ OP / OAB\par daughter reports that her memory continues to deteriorate\par she does not go out by herself any more \par no fasll \par \par MRI brain - microvascular injury \par \par no falls\par getting prolia shots\par \par bladder control better on oxybutinin \par

## 2019-10-23 ENCOUNTER — APPOINTMENT (OUTPATIENT)
Dept: INTERNAL MEDICINE | Facility: CLINIC | Age: 79
End: 2019-10-23
Payer: MEDICAID

## 2019-10-23 PROCEDURE — G0008: CPT

## 2019-10-23 PROCEDURE — 90662 IIV NO PRSV INCREASED AG IM: CPT

## 2019-10-30 ENCOUNTER — RX RENEWAL (OUTPATIENT)
Age: 79
End: 2019-10-30

## 2019-11-05 ENCOUNTER — RX RENEWAL (OUTPATIENT)
Age: 79
End: 2019-11-05

## 2019-11-08 ENCOUNTER — APPOINTMENT (OUTPATIENT)
Dept: NEUROLOGY | Facility: CLINIC | Age: 79
End: 2019-11-08
Payer: MEDICARE

## 2019-11-08 VITALS
HEIGHT: 58 IN | HEART RATE: 83 BPM | SYSTOLIC BLOOD PRESSURE: 151 MMHG | WEIGHT: 107 LBS | DIASTOLIC BLOOD PRESSURE: 76 MMHG | BODY MASS INDEX: 22.46 KG/M2

## 2019-11-08 PROCEDURE — 96116 NUBHVL XM PHYS/QHP 1ST HR: CPT | Mod: 59

## 2019-11-08 PROCEDURE — 99205 OFFICE O/P NEW HI 60 MIN: CPT | Mod: 25

## 2019-11-08 RX ORDER — FOLIC ACID/MULTIVIT,IRON,MINER .4-18-35
TABLET,CHEWABLE ORAL
Refills: 0 | Status: ACTIVE | COMMUNITY
Start: 2018-09-11

## 2019-11-08 RX ORDER — CHLORHEXIDINE GLUCONATE 4 %
1000 LIQUID (ML) TOPICAL DAILY
Refills: 0 | Status: ACTIVE | COMMUNITY
Start: 2018-09-11

## 2019-11-08 NOTE — HISTORY OF PRESENT ILLNESS
[FreeTextEntry1] : The patient is a 79 year old right handed woman referred by PCP Dr. Blessing Fuentes\par \par Symptoms since approx 2014 4 yrs ago, progressive. Would put items away and not remember where. No delusions of theft. Forgetting recent conversations and events. Doesnt cue. No major repetitiveness. Better with remote memories. Recipes taste different- may over or undersalt for example, forgetting if she did that already.  Left stove on a number of times, leaves lights on. Never drove. Daughter takes her places. Used to walk in neighborhood to doctor's appointments but not in the last yr, but now shell be accompanied because shes more unsure of herself and they are concerned for her doing it properly. Never tech savvy, hard with OS upgrades on her phone even when similar look.\par \par She was in [place] for 2 months, returned 2 weeks ago. She traveled alone. It was her annual trip to same place she always stays, she did well, no concerns while there. Fine when returned.\par \par Mood/psych: she says she feels very mcdonald. denies prior anxiety and depression hx. Going places alone she gets anxious since approx 2017.\par No hallucinations AH VH or [place].\par No hx sz or staring spell.\par No hx stroke.\par Hx LOC 2018 in the heat, wasn’t feeling well, denies head trauma.\par \par sleep: not sleeping well recently. 8-9p bedtime, sleeps 1 hr then cant go back to bed until 1-2a, another 2 hrs then wakes up. Some related to nocturia. Doesnt think she snores but may and wakes up with a dry mouth. no gasping. not groggy in morning. some low energy. no acting out dreams.\par \par Routine is usually to stay in the house. Housework, cooking, does crossword. \par Daughter notices she has missed pills last few weeks, shes going to get pillbox. Was forgetting to do bills so now daughter does.\par \par Fell 2 yrs ago and had rib fracture. She was awake and trying to get OOB and fell.\par \par In office visit with PCP 7'19 MMSE 16. Sent for mri that shows mild-mod microvascular ischemic disease as well as atrophy.\par \par this visit i rec fdg pet scan, home sleep study, eeg, bloodwork, start asa, have doctor change off oxybutynin

## 2019-11-08 NOTE — PROCEDURE
[FreeTextEntry1] : EXTENDED NEUROBEHAVIORAL STATUS TESTING\par \par [This is a separate procedure note for the Neurobehavioral Status Examination that was performed during the encounter]. \par \par The patient was alert, well groomed, NAD. She was fluent with accented speech without paraphasic errors. anomia in conversation at times. Comprehension was intact. there was some psychomotor slowing. She appeared euthymic and reactive though I wondered about depression from a low energy i sensed from her but they denied. She allowed her daughter to lead the discussion but remained attentive, didn’t chime in spont.\par \par Reports 17 grandkids which was accurate. for naming, organized by child's kids and was accurate with number boys/girls, needed phonemic cues for 5 or more of them (said she could picture them). \par she knew of her medications.\par \par recent memory: can name the president. wasn’t aware of political news, says she doesn’t follow it. could says she celebrated di[place]op[place] after prompted.  when prompted re: event last weekend she could recall but was vague, recognized when prompted.\par \par MoCA (version 8.1) score out of 30: 9 + 1 for educ = 10\par Memory index score (MIS) out of 15: 4\par Visuospatial/Executive \par 	Trails: (-1)\par 	Cube: (-1) square with internal lines\par 	Clock: (-2) 1 in 12 spot added 12 at end but in 11 spot. concrete hands\par Naming: (-2) rhino and camel got with phonemic cues\par Memory- registration: 4/5, 4/5, then 5/5\par Attention \par 	Digit span 5F: (-1)\par 	Digit span 3R: intact\par 	Letter A test: intact after 2nd attempt with 1 false positive\par 	Serial 7 subtraction: (-2) 1/5\par Language \par 	Phrase repetition: (-2) semantic errors\par 	F word fluency- # words: (-1) 8\par Abstraction\par 	Train/bicycle: (-1) described their differences\par 	Watch/ruler: (-1) numbers\par Delayed recall score out of 5: (-5) 0\par 	Additional words recalled with category cue: 1 and 2 false positives\par 	Additional words recalled with multiple choice cue: 2\par Orientation: (-2) date and city just knew LI\par \par Additional neurobehavioral status tests:\par Animal naming fluency- # words: 8\par Praxis\par       Ideomotor limb\par             Transitive\par 	    Brush teeth: intact b/l\par 	    Comb hair: intact b/l\par             Intransitive\par 	    Wave goodbye: intact b/l\par 	    Motion "come here": intact b/l\par       Meaningless gestures: intact to 3/4 (not crossed fingers)\par Right/Left orientation\par 	"Show me your right hand": correct \par 	"Show me your left hand": correct \par 	"With your right hand touch your left ear": correct\par 	"With your right hand, point to my left shoulder": correct\par 	"With your left hand, point to my left ear": correct on 2nd attempt\par \par INTERPRETATION: \par \par I carefully reviewed the above results of neurobehavioral status testing. The cognitive domains are listed below with my interpretation of whether or not there is an impairment or if performance was within normal limits. \par It should be noted that this testing is distinct from standard neuropsychological testing, which compares the patient's raw scores on different validated batteries of tests to those of their age-matched peers. Therefore subtle deficits may not be apparent on this testing, or instead some incorrect responses may overestimate deficits.\par \par Cognitive domains:\par 	Attention: impaired\par 	Working memory: impaired\par 	Executive function: impaired set shifting, abstraction\par 	Language: similar phonemic and semantic fluency. anomia that responded to phonemic cues in conversation and testing. some semantic paraphasic errors with repetition. \par 	Memory: in conversation some general recent memories were ok but vague and required prompts, on testing she had impaired registration and recall, only partial response to cues and false positives\par 	Visuospatial function: impaired\par 	Praxis: intact aside from 1 meaningless gesture\par 	Behavior/Mood: appropriate/euthymic but low energy state\par 	Other comments: some psychomotor slowing. mostly oriented. \par \par Please refer to the assessment section of this encounter that documents how to incorporate the interpretation of these results into the patient's diagnosis and plan of care.\par \par 35 min were taken to administer and interpret this extended neurobehavioral evaluation and prepare the report. \par \par Testing start time: 12:25p\par Testing stop time: 12:45p\par Report time: 15 min\par

## 2019-11-08 NOTE — PHYSICAL EXAM
[FreeTextEntry1] : General appearance: The patient is awake and alert, in no acute distress.\par Eyes: PERRL, moist conjunctiva, no scleral icterus.\par ENT: Oropharynx clear of any exudate or lesions. Dentition unremarkable. No lesions on lips or gums.\par Neck: supple, trachea midline. \par Pulmonary: Normal respiratory effort, no audible wheezing.\par Cardiac: Regular rate and rhythm. \par Vascular: No peripheral edema.\par Musculoskeletal: Gait and strength as noted in "Neurologic Examination" below. No clubbing or cyanosis. Normal range of motion.\par Skin: Warm and dry. No rashes or lesions. No bruising.\par Neurologic: See separate "Neurologic Examination" below.\par Psychiatric: Mood, affect and orientation as noted below in "Extended Neurobehavioral Examination".\par \par \par \par NEUROLOGIC EXAMINATION\par \par Cranial Nerves: \par visual fields full to confrontation\par pupils equal round and reactive to light\par extraocular motion intact without nystagmus except dec upgaze\par facial sensation intact symmetrically\par face symmetrical\par hearing intact to conversation bilaterally\par palate raises symmetrically, head turning and shoulder shrug symmetric, tongue midline without deviation with protrusion.\par No dysarthria.\par Motor: muscle tone normal\par            no pronator drift\par            fine finger movements symmetric \par            muscle strength normal in all 4 extremities and normal bulk in all 4 extremities\par Sensory exam: light touch was intact. Romberg's sign was negative\par Coordination: no tremor\par                       intact with finger to nose bilaterally\par Gait: steady but a little slower, turn ok, maybe dec armswing on the right\par Deep tendon reflexes: 2+ at brachioradialis, biceps, triceps, and patellar bilaterally\par Primitive reflexes: No grasp reflex. No palmomental reflex. \par Cortical Sensory signs: No extinction to double simultaneous stimulation.\par \par \par  Activities of Daily Living ([place]): independent vs. needs some help vs. unable/needs major assistance.      \par            --responses were the following: except where noted, indep                                          \par 1. Bathing/showering\par 2. Dressing\par 3. Toileting\par 4. Transferring\par 5. Continence\par 6. Feeding                                                                                                                                                           \par \par Instrumental Activities of Daily Living ([place]): independent vs. needs some help vs. unable/needs major assistance.     \par            --responses were the following: except where noted, steven ehelp\par 1. Ability to use telephone- indep\par 2. Shopping\par 3. Food preparation- indep\par 4. Housekeeping- indep\par 5. Laundry\par 6. Transportation (public/arranging rides/driving)- unable\par 7. Responsibility for own medications\par 8. Ability to handle finances\par

## 2019-11-08 NOTE — DATA REVIEWED
[de-identified] : \par hct 5/3/18 (LOC w head trauma) neg\par i personally reviewed and see microvascular ischemic disease \par \par mr brain 7/21/19 mild-mod enlargment vents and sulci greater than expected for age, more pronounced vol loss b/l T and FP regions. microvascular ischemic disease . mineralization/calcification GP, SN, red nuclei, slight hemosiderosis along basal cisterns\par I personally reviewed and agree, mild-mod microvascular ischemic disease , mild starophy more in biparietal and sylvian fissure regions. i thought swi neg.

## 2019-11-11 ENCOUNTER — APPOINTMENT (OUTPATIENT)
Dept: ENDOCRINOLOGY | Facility: CLINIC | Age: 79
End: 2019-11-11
Payer: MEDICARE

## 2019-11-11 VITALS
DIASTOLIC BLOOD PRESSURE: 80 MMHG | HEART RATE: 72 BPM | WEIGHT: 107 LBS | SYSTOLIC BLOOD PRESSURE: 120 MMHG | HEIGHT: 58 IN | BODY MASS INDEX: 22.46 KG/M2 | OXYGEN SATURATION: 98 %

## 2019-11-11 PROCEDURE — 96372 THER/PROPH/DIAG INJ SC/IM: CPT

## 2019-11-11 PROCEDURE — 99212 OFFICE O/P EST SF 10 MIN: CPT | Mod: 25

## 2019-11-11 RX ORDER — IBUPROFEN 200 MG
600 CAPSULE ORAL
Qty: 60 | Refills: 3 | Status: ACTIVE | COMMUNITY
Start: 2018-09-11

## 2019-11-11 NOTE — ADDENDUM
[FreeTextEntry1] : Prolia:\par Injection site: LLQ \par SN [id number]\par lot [id number]\par Exp: 02/22

## 2019-11-11 NOTE — ASSESSMENT
[FreeTextEntry1] : 80 yo F with PMH osteoporosis, prediabetes, HLD, iatrogenic menopause \par \par 1. Osteoporosis - previously on fosamax. will give 4th injection of prolia today. Continue calcium 600 mg qd (to be verified) and vitamin D 1000 iu qd.  Repeat DEXA one year post initiation of prolia as above. Will RV 4/2020 for repeat DEXA - to decide on drug holiday at that time. \par \par 2. Prediabetes - HbA1c last within normal limits Lifestyle modification\par \par RV 4/2020

## 2019-11-11 NOTE — HISTORY OF PRESENT ILLNESS
[FreeTextEntry1] : 80 yo F with PMH osteoporosis, prediabetes, HLD, iatrogenic menopause \par \par Was placed on fosamax approximately 7 years ago for presumed initial diagnosis of osteoporosis.\par She had a LENCHO BSO at age 37  with subsequent menopause. It was performed due to fibroids. \par She has been compliant with the medication. \par Sustained R posterior 6th rib fx 8/2017 after falling out of bed.\par no further fractures\par her mother did not have a hip fx\par never smoked\par no prolonged steroid use\par denies history of GERD\par DEXA 11/24/17 revealed osteopenia of the femoral neck with T score of -1.8, osteopenia of the total hip - 1.4, and osteoporosis of the lumbar spine with t score of -3, and osteoporosis of the distal 1/3 of the wrist with T score of  -2.8\par She received initial injection of prolia 3/14/18 - well-tolerated. She presents today for her 3rd injection\par DEXA 4/23/19 showed improvement in the distal 1/3 of the wrist to -2.1 and improvement of the spine to -2.7. Osteopenia of the hip noted. \par She takes calcium 600 mg QD and vitamin D 1000 IU QD\par no invasive dental work planned\par

## 2019-11-12 ENCOUNTER — APPOINTMENT (OUTPATIENT)
Dept: NEUROLOGY | Facility: CLINIC | Age: 79
End: 2019-11-12

## 2019-11-15 ENCOUNTER — APPOINTMENT (OUTPATIENT)
Dept: NEUROLOGY | Facility: CLINIC | Age: 79
End: 2019-11-15
Payer: MEDICARE

## 2019-11-15 LAB
CALCIUM SERPL-MCNC: 9.4 MG/DL
PARATHYROID HORMONE INTACT: 56 PG/ML
VIT B12 SERPL-MCNC: 1704 PG/ML

## 2019-11-15 PROCEDURE — 95816 EEG AWAKE AND DROWSY: CPT

## 2019-11-18 LAB — T PALLIDUM AB SER QL IA: NEGATIVE

## 2019-11-25 ENCOUNTER — APPOINTMENT (OUTPATIENT)
Dept: ENDOCRINOLOGY | Facility: CLINIC | Age: 79
End: 2019-11-25

## 2019-11-25 ENCOUNTER — FORM ENCOUNTER (OUTPATIENT)
Age: 79
End: 2019-11-25

## 2019-11-26 ENCOUNTER — APPOINTMENT (OUTPATIENT)
Dept: NUCLEAR MEDICINE | Facility: IMAGING CENTER | Age: 79
End: 2019-11-26
Payer: MEDICARE

## 2019-11-26 ENCOUNTER — OUTPATIENT (OUTPATIENT)
Dept: OUTPATIENT SERVICES | Facility: HOSPITAL | Age: 79
LOS: 1 days | End: 2019-11-26
Payer: COMMERCIAL

## 2019-11-26 DIAGNOSIS — F03.90 UNSPECIFIED DEMENTIA WITHOUT BEHAVIORAL DISTURBANCE: ICD-10-CM

## 2019-11-26 DIAGNOSIS — Z90.710 ACQUIRED ABSENCE OF BOTH CERVIX AND UTERUS: Chronic | ICD-10-CM

## 2019-11-26 PROCEDURE — 78608 BRAIN IMAGING (PET): CPT

## 2019-11-26 PROCEDURE — 78608 BRAIN IMAGING (PET): CPT | Mod: 26

## 2019-11-26 PROCEDURE — A9552: CPT

## 2020-01-08 ENCOUNTER — APPOINTMENT (OUTPATIENT)
Dept: NEUROLOGY | Facility: CLINIC | Age: 80
End: 2020-01-08
Payer: MEDICARE

## 2020-01-08 VITALS
HEART RATE: 79 BPM | BODY MASS INDEX: 20.99 KG/M2 | SYSTOLIC BLOOD PRESSURE: 139 MMHG | WEIGHT: 100 LBS | HEIGHT: 58 IN | DIASTOLIC BLOOD PRESSURE: 74 MMHG

## 2020-01-08 DIAGNOSIS — R06.83 SNORING: ICD-10-CM

## 2020-01-08 PROCEDURE — 95806 SLEEP STUDY UNATT&RESP EFFT: CPT

## 2020-01-08 PROCEDURE — 99214 OFFICE O/P EST MOD 30 MIN: CPT

## 2020-01-08 NOTE — PHYSICAL EXAM
[FreeTextEntry1] : Awake and alert, in no acute distress\par Attends to both sides. Conjugate gaze without directional limitation. No dysarthria. Face symmetric\par Moves extremities symmetrically\par No dysmetria with reaching for objects\par Gait : steady but a little slower, turn ok, maybe dec armswing on the right\par  \par \par The patient was alert, well groomed, NAD. She was fluent with accented speech without paraphasic errors. anomia in conversation at times. Comprehension was intact. there was some psychomotor slowing. She appeared euthymic and reactive. She allowed her daughter to lead the discussion but remained attentive, didn’t chime in spont.\par \par

## 2020-01-08 NOTE — DATA REVIEWED
[de-identified] : \par eeg 11/15/19 mild-mod slowing, no epileptiform activity [de-identified] : \par hct 5/3/18 (LOC w head trauma) neg\par i personally reviewed and see microvascular ischemic disease \par \par mr brain 7/21/19 mild-mod enlargment vents and sulci greater than expected for age, more pronounced vol loss b/l T and FP regions. microvascular ischemic disease . mineralization/calcification GP, SN, red nuclei, slight hemosiderosis along basal cisterns\par I personally reviewed and agree, mild-mod microvascular ischemic disease , mild starophy more in biparietal and sylvian fissure regions. i thought swi neg. [de-identified] : \par fdg pet scan 11/2619 symm mod dec P and T lobes mild dec F lobnes c/w AD\par i pesronally reviewed and agree [de-identified] : \par Neurobehavioral status testing 11/8/19\par Cognitive domains:\par 	Attention: impaired\par 	Working memory: impaired\par 	Executive function: impaired set shifting, abstraction\par 	Language: similar phonemic and semantic fluency. anomia that responded to phonemic cues in conversation and testing. some semantic paraphasic errors with repetition. \par 	Memory: in conversation some general recent memories were ok but vague and required prompts, on testing she had impaired registration and recall, only partial response to cues and false positives\par 	Visuospatial function: impaired\par 	Praxis: intact aside from 1 meaningless gesture\par 	Behavior/Mood: appropriate/euthymic but low energy state\par 	Other comments: some psychomotor slowing. mostly oriented. \par               moca 9 + 1 for educ = 10, mis 4/15

## 2020-01-08 NOTE — ASSESSMENT
[FreeTextEntry1] : The patient is a 79 year old right handed woman with progressive cognitive decline since approx 2014, affecting IADLs. On neurobehavioral status testing she had impaired attention, working memory, exec function, and memory registration and recall with only partial response to cues and false positives, conversational memory with recent events required prompts and then still vague, similar phonemic and semantic fluency. Word production anomia. Praxis spared aside from 1 meaningless gesture. there was some psychomotor slowing. \par \par Counseled that she has dementia of mild to moderate stage. 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 FDG PET scan was c/w AD.  \par \par We discussed other possible contributors to her symptoms, including anticholinergic effects of oxybuytinin, possible GORAN given snoring and nocturia and cognitive pattern on testing. \par \par Counseled on natural history of mixed dementia, lack of curative drugs, FDA approved medications for AD and their risks/benefits/expectations. Shes on asa 81mg daily. Will start aricept after she comes off oxybutinin\par \par Counseled on strategies for maintaining cognitive health. Counseled on safety concerns.

## 2020-01-08 NOTE — HISTORY OF PRESENT ILLNESS
[FreeTextEntry1] : Interval hx:\par 01/08/2020 visit:\par fdg pet scan was c/b ad. eeg without epielptiform activity\par hst not done yet\par didn’t see doctor yet to come off oxybutinin\par on asa 81mg\par usually is at home. we discussed joining senior center.\par no sundowning. no agitation. doing well.\par wants to start aricept once the oxybutinin is settled\par \par ------------------------------\par Background information (initial visit 11/8/19):\par \par  Patient accompanied by daughter Leslye. \par \par The patient is a 79 year old right handed woman referred by PCP Dr. Blessing Fuentes\par \par Symptoms since approx 2014 4 yrs ago, progressive. Would put items away and not remember where. No delusions of theft. Forgetting recent conversations and events. Doesnt cue. No major repetitiveness. Better with remote memories. Recipes taste different- may over or undersalt for example, forgetting if she did that already.  Left stove on a number of times, leaves lights on. Never drove. Daughter takes her places. Used to walk in neighborhood to doctor's appointments but not in the last yr, but now shell be accompanied because shes more unsure of herself and they are concerned for her doing it properly. Never tech savvy, hard with OS upgrades on her phone even when similar look.\par \par She was in [place] for 2 months, returned 2 weeks ago. She traveled alone. It was her annual trip to same place she always stays, she did well, no concerns while there. Fine when returned.\par \par Mood/psych: she says she feels very mcdonald. denies prior anxiety and depression hx. Going places alone she gets anxious since approx 2017.\par No hallucinations AH VH or OH.\par No hx sz or staring spell.\par No hx stroke.\par Hx LOC 2018 in the heat, wasn’t feeling well, denies head trauma.\par \par sleep: not sleeping well recently. 8-9p bedtime, sleeps 1 hr then cant go back to bed until 1-2a, another 2 hrs then wakes up. Some related to nocturia. Doesnt think she snores but may and wakes up with a dry mouth. no gasping. not groggy in morning. some low energy. no acting out dreams.\par \par Routine is usually to stay in the house. Housework, cooking, does crossword. \par Daughter notices she has missed pills last few weeks, shes going to get pillbox. Was forgetting to do bills so now daughter does.\par \par Fell 2 yrs ago and had rib fracture. She was awake and trying to get OOB and fell.\par \par In office visit with PCP 7'19 MMSE 16. Sent for mri that shows mild-mod microvascular ischemic disease as well as atrophy.\par \par this visit i rec fdg pet scan, home sleep study, eeg, bloodwork, start asa, have doctor change off oxybutynin

## 2020-01-17 ENCOUNTER — APPOINTMENT (OUTPATIENT)
Dept: UROLOGY | Facility: CLINIC | Age: 80
End: 2020-01-17
Payer: MEDICARE

## 2020-01-17 PROCEDURE — 99213 OFFICE O/P EST LOW 20 MIN: CPT

## 2020-01-17 NOTE — PHYSICAL EXAM
[General Appearance - Well Developed] : well developed [General Appearance - In No Acute Distress] : no acute distress [General Appearance - Well Nourished] : well nourished [Abdomen Tenderness] : non-tender [Abdomen Soft] : soft [Costovertebral Angle Tenderness] : no ~M costovertebral angle tenderness [Edema] : no peripheral edema [Exaggerated Use Of Accessory Muscles For Inspiration] : no accessory muscle use [Normal Station and Gait] : the gait and station were normal for the patient's age [Oriented To Time, Place, And Person] : oriented to person, place, and time

## 2020-01-18 ENCOUNTER — APPOINTMENT (OUTPATIENT)
Dept: INTERNAL MEDICINE | Facility: CLINIC | Age: 80
End: 2020-01-18
Payer: MEDICARE

## 2020-01-18 VITALS
SYSTOLIC BLOOD PRESSURE: 134 MMHG | TEMPERATURE: 98.5 F | BODY MASS INDEX: 20.99 KG/M2 | HEART RATE: 76 BPM | WEIGHT: 100 LBS | OXYGEN SATURATION: 97 % | HEIGHT: 58 IN | DIASTOLIC BLOOD PRESSURE: 68 MMHG

## 2020-01-18 PROCEDURE — 36415 COLL VENOUS BLD VENIPUNCTURE: CPT

## 2020-01-18 PROCEDURE — G0439: CPT | Mod: 25

## 2020-01-18 NOTE — HEALTH RISK ASSESSMENT
[Good] : ~his/her~  mood as  good [] : No [No] : No [No falls in past year] : Patient reported no falls in the past year [0] : 2) Feeling down, depressed, or hopeless: Not at all (0) [de-identified] : NONE [de-identified] : REGULAR  [Change in mental status noted] : Change in mental status noted [None] : None [With Family] : lives with family [Retired] : retired [] :  [Sexually Active] : not sexually active [Feels Safe at Home] : Feels safe at home [Fully functional (bathing, dressing, toileting, transferring, walking, feeding)] : Fully functional (bathing, dressing, toileting, transferring, walking, feeding) [Reports changes in hearing] : Reports no changes in hearing [Reports changes in vision] : Reports changes in vision [Reports changes in dental health] : Reports changes in dental health [Smoke Detector] : smoke detector [Carbon Monoxide Detector] : carbon monoxide detector [Safety elements used in home] : safety elements used in home [Seat Belt] : does not use seat belt [MammogramDate] : 2018 [BoneDensityComments] : OP [BoneDensityDate] : 2019 [ColonoscopyDate] : 2016 [de-identified] : Needs help with transport / shopping  [de-identified] : MEMORY  [Name: ___] : Health Care Proxy's Name: [unfilled]  [Relationship: ___] : Relationship: [unfilled] [AdvancecareDate] : 01/18/2020

## 2020-01-18 NOTE — ASSESSMENT
[FreeTextEntry1] : 1) Wellness check \par \par - diet / exercise / home safety addressed \par - check lipid pane, A1C - nl\par - UTD BMD / colonoscopy/ vaccines \par - needs mammo\par \par 2) OA\par - meds changed - awaiting filling at pharmacy

## 2020-01-21 LAB
CHOLEST SERPL-MCNC: 173 MG/DL
CHOLEST/HDLC SERPL: 2 RATIO
HDLC SERPL-MCNC: 85 MG/DL
LDLC SERPL CALC-MCNC: 80 MG/DL
TRIGL SERPL-MCNC: 40 MG/DL

## 2020-01-22 ENCOUNTER — RX RENEWAL (OUTPATIENT)
Age: 80
End: 2020-01-22

## 2020-01-27 RX ORDER — OXYBUTYNIN CHLORIDE 5 MG/1
5 TABLET, EXTENDED RELEASE ORAL
Qty: 90 | Refills: 0 | Status: DISCONTINUED | COMMUNITY
Start: 2018-02-12 | End: 2020-01-22

## 2020-01-28 NOTE — ASSESSMENT
[FreeTextEntry1] : OAB well controlled with meds but with concern contributing to altered mental status. Need to d/c per neuro. DIscussed trospium vs myrbetriq. no other s/e\par --Trial of trospium\par

## 2020-01-28 NOTE — HISTORY OF PRESENT ILLNESS
[FreeTextEntry1] : 80yo female with cc of OAB. Pt was seen by Dr Gonzalez in the past for UTI. Seen by me for OAB. At baseline, has some urinary frequency. Mostly 1-2 times per night. Occasional 3x. Has some daytime frequency but this is mostly dependent on fluid intake. Had urgency in the past but none recently. No straining or feelings of incomplete emptying. Has been on oxybutynin with above benefits. On this for about 2y. \par \par SHe comes in today with daughter. Pt with recent issues with memory and saw neuro. Dx with vascular dementia. Planning on starting aricept. Neuro discussed d/c oxybutynin for concerns adding to altered mental status.

## 2020-02-12 ENCOUNTER — FORM ENCOUNTER (OUTPATIENT)
Age: 80
End: 2020-02-12

## 2020-02-13 ENCOUNTER — APPOINTMENT (OUTPATIENT)
Dept: NEUROLOGY | Facility: CLINIC | Age: 80
End: 2020-02-13
Payer: MEDICARE

## 2020-02-13 ENCOUNTER — OUTPATIENT (OUTPATIENT)
Dept: OUTPATIENT SERVICES | Facility: HOSPITAL | Age: 80
LOS: 1 days | End: 2020-02-13
Payer: COMMERCIAL

## 2020-02-13 ENCOUNTER — NON-APPOINTMENT (OUTPATIENT)
Age: 80
End: 2020-02-13

## 2020-02-13 ENCOUNTER — APPOINTMENT (OUTPATIENT)
Dept: MAMMOGRAPHY | Facility: IMAGING CENTER | Age: 80
End: 2020-02-13
Payer: MEDICARE

## 2020-02-13 VITALS
WEIGHT: 100 LBS | BODY MASS INDEX: 20.99 KG/M2 | HEIGHT: 58 IN | DIASTOLIC BLOOD PRESSURE: 72 MMHG | HEART RATE: 90 BPM | SYSTOLIC BLOOD PRESSURE: 145 MMHG

## 2020-02-13 DIAGNOSIS — Z90.710 ACQUIRED ABSENCE OF BOTH CERVIX AND UTERUS: Chronic | ICD-10-CM

## 2020-02-13 PROCEDURE — 77063 BREAST TOMOSYNTHESIS BI: CPT | Mod: 26

## 2020-02-13 PROCEDURE — 99214 OFFICE O/P EST MOD 30 MIN: CPT

## 2020-02-13 PROCEDURE — 77063 BREAST TOMOSYNTHESIS BI: CPT

## 2020-02-13 PROCEDURE — 77067 SCR MAMMO BI INCL CAD: CPT

## 2020-02-13 PROCEDURE — 77067 SCR MAMMO BI INCL CAD: CPT | Mod: 26

## 2020-02-13 RX ORDER — TROSPIUM CHLORIDE 60 MG/1
60 CAPSULE, EXTENDED RELEASE ORAL
Qty: 30 | Refills: 11 | Status: COMPLETED | COMMUNITY
Start: 2020-01-17 | End: 2020-02-13

## 2020-02-13 RX ORDER — DONEPEZIL HYDROCHLORIDE 5 MG/1
5 TABLET ORAL
Qty: 30 | Refills: 5 | Status: DISCONTINUED | COMMUNITY
Start: 2020-01-27 | End: 2020-02-13

## 2020-02-13 NOTE — DATA REVIEWED
[de-identified] : \par hct 5/3/18 (LOC w head trauma) neg\par i personally reviewed and see microvascular ischemic disease \par \par mr brain 7/21/19 mild-mod enlargment vents and sulci greater than expected for age, more pronounced vol loss b/l T and FP regions. microvascular ischemic disease . mineralization/calcification GP, SN, red nuclei, slight hemosiderosis along basal cisterns\par I personally reviewed and agree, mild-mod microvascular ischemic disease , mild starophy more in biparietal and sylvian fissure regions. i thought swi neg. [de-identified] : \par eeg 11/15/19 mild-mod slowing, no epileptiform activity [de-identified] : \par fdg pet scan 11/2619 symm mod dec P and T lobes mild dec F lobnes c/w AD\par i pesronally reviewed and agree [de-identified] : \par Neurobehavioral status testing 11/8/19\par Cognitive domains:\par 	Attention: impaired\par 	Working memory: impaired\par 	Executive function: impaired set shifting, abstraction\par 	Language: similar phonemic and semantic fluency. anomia that responded to phonemic cues in conversation and testing. some semantic paraphasic errors with repetition. \par 	Memory: in conversation some general recent memories were ok but vague and required prompts, on testing she had impaired registration and recall, only partial response to cues and false positives\par 	Visuospatial function: impaired\par 	Praxis: intact aside from 1 meaningless gesture\par 	Behavior/Mood: appropriate/euthymic but low energy state\par 	Other comments: some psychomotor slowing. mostly oriented. \par               moca 9 + 1 for educ = 10, mis 4/15

## 2020-02-13 NOTE — PHYSICAL EXAM
[FreeTextEntry1] : Awake and alert, in no acute distress\par Attends to both sides. Conjugate gaze without directional limitation. No dysarthria. Face symmetric\par Moves extremities symmetrically\par No dysmetria with reaching for objects\par Gait : steady but a little slower, turn ok\par  \par \par The patient was alert, well groomed, NAD. She was fluent with accented speech without paraphasic errors. anomia in conversation at times not heard today. Comprehension was intact. i didn’t notice as much psychomotor slowing today but it was a shorter visit. She appeared euthymic and reactive. She allowed her daughter to lead the discussion but remained attentive, didn’t chime in spont.\par \par can name the president\par oriented to feb, not date, not year- thought 2019 \par knows a holiday is tomorrow but wasn’t sure which, hints didn’t help\par \par \par

## 2020-02-13 NOTE — ASSESSMENT
[FreeTextEntry1] : The patient is a 80 year old right handed woman with progressive cognitive decline since approx 2014, affecting IADLs. On neurobehavioral status testing she had impaired attention, working memory, exec function, and memory registration and recall with only partial response to cues and false positives, conversational memory with recent events required prompts and then still vague, similar phonemic and semantic fluency. Word production anomia. Praxis spared aside from 1 meaningless gesture. there was some psychomotor slowing. \par \par Counseled that she has dementia of mild to moderate stage. 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 FDG PET scan was c/w AD.  \par \par Re: other possible contributors, shes off oxybuytinin- no obvious change in cognition, and HST was without GORAN.\par \par Counseled on natural history of mixed dementia, lack of curative drugs, FDA approved medications for AD and their risks/benefits/expectations. Shes on asa 81mg daily. Tolerating aricept, will increase to 10mg when completes 5mg for 1 month.\par \par Counseled on strategies for maintaining cognitive health. Counseled on safety concerns.

## 2020-02-13 NOTE — HISTORY OF PRESENT ILLNESS
[FreeTextEntry1] : Interval hx:\par 2/13/20 visit:\par 1/8/20 HST nl AHI 2.6 oxygen min of 71% was an error in the reading.\par 1/27 i spoke with her daughter on the phone. Patient was switched off oxybutinin to myrbetriq (first trospium rx'ed but insurance wouldn’t cover). We then started aricept 5mg.\par \par They don’t think there was obvious change with coming off oxybutinin and neither with the addition aricept 5mg. This visit increased to 10mg to start once she finishes full month at 5mg\par \par shes not willing to go to a senior center.\par \par rec more exercise- ordered exercise rx\par \par ------------------------------\par Background information (initial visit 11/8/19):\par \par  Patient accompanied by daughter Leslye. \par \par The patient is a 79 year old right handed woman referred by PCP Dr. Blessing Fuentes\par \par Symptoms since approx 2014 4 yrs ago, progressive. Would put items away and not remember where. No delusions of theft. Forgetting recent conversations and events. Doesnt cue. No major repetitiveness. Better with remote memories. Recipes taste different- may over or undersalt for example, forgetting if she did that already.  Left stove on a number of times, leaves lights on. Never drove. Daughter takes her places. Used to walk in neighborhood to doctor's appointments but not in the last yr, but now shell be accompanied because shes more unsure of herself and they are concerned for her doing it properly. Never tech savvy, hard with OS upgrades on her phone even when similar look.\par \par She was in [place] for 2 months, returned 2 weeks ago. She traveled alone. It was her annual trip to same place she always stays, she did well, no concerns while there. Fine when returned.\par \par Mood/psych: she says she feels very mcdonald. denies prior anxiety and depression hx. Going places alone she gets anxious since approx 2017.\par No hallucinations AH VH or [place].\par No hx sz or staring spell.\par No hx stroke.\par Hx LOC 2018 in the heat, wasn’t feeling well, denies head trauma.\par \par sleep: not sleeping well recently. 8-9p bedtime, sleeps 1 hr then cant go back to bed until 1-2a, another 2 hrs then wakes up. Some related to nocturia. Doesnt think she snores but may and wakes up with a dry mouth. no gasping. not groggy in morning. some low energy. no acting out dreams.\par \par Routine is usually to stay in the house. Housework, cooking, does crossword. \par Daughter notices she has missed pills last few weeks, shes going to get pillbox. Was forgetting to do bills so now daughter does.\par \par Fell 2 yrs ago and had rib fracture. She was awake and trying to get OOB and fell.\par \par In office visit with PCP 7'19 MMSE 16. Sent for mri that shows mild-mod microvascular ischemic disease as well as atrophy.\par \par this visit i rec fdg pet scan, home sleep study, eeg, bloodwork, start asa, have doctor change off oxybutynin\par \par 01/08/2020 visit: Patient accompanied by daughter Leslye. \par fdg pet scan was c/w ad. eeg without epielptiform activity\par hst not done yet\par didn’t see doctor yet to come off oxybutinin\par on asa 81mg\par usually is at home. we discussed joining senior center.\par no sundowning. no agitation. doing well.\par wants to start aricept once the oxybutinin is settled

## 2020-02-18 NOTE — ED ADULT NURSE REASSESSMENT NOTE - NS ED NURSE REASSESS COMMENT FT1
pt received from day JUAN A Mondragon. pt received a&ox4 amb. pt reports feeling much better after medication. denies any current n/v. offers no complaints at this time. awaiting UA results. will continue to monitor. yes

## 2020-02-25 RX ORDER — DONEPEZIL HYDROCHLORIDE 10 MG/1
10 TABLET ORAL
Qty: 90 | Refills: 2 | Status: DISCONTINUED | COMMUNITY
Start: 2020-02-13 | End: 2020-02-25

## 2020-06-29 LAB
25(OH)D3 SERPL-MCNC: 42.9 NG/ML
ALBUMIN SERPL ELPH-MCNC: 4.5 G/DL
ALP BLD-CCNC: 66 U/L
ALT SERPL-CCNC: 22 U/L
ANION GAP SERPL CALC-SCNC: 12 MMOL/L
AST SERPL-CCNC: 28 U/L
BASOPHILS # BLD AUTO: 0.02 K/UL
BASOPHILS NFR BLD AUTO: 0.3 %
BILIRUB SERPL-MCNC: 0.4 MG/DL
BUN SERPL-MCNC: 20 MG/DL
CALCIUM SERPL-MCNC: 9.3 MG/DL
CHLORIDE SERPL-SCNC: 102 MMOL/L
CO2 SERPL-SCNC: 25 MMOL/L
CREAT SERPL-MCNC: 0.81 MG/DL
EOSINOPHIL # BLD AUTO: 0.06 K/UL
EOSINOPHIL NFR BLD AUTO: 0.9 %
ESTIMATED AVERAGE GLUCOSE: 111 MG/DL
GLUCOSE SERPL-MCNC: 95 MG/DL
HBA1C MFR BLD HPLC: 5.5 %
HCT VFR BLD CALC: 35.6 %
HGB BLD-MCNC: 11.8 G/DL
IMM GRANULOCYTES NFR BLD AUTO: 0.2 %
LYMPHOCYTES # BLD AUTO: 2.41 K/UL
LYMPHOCYTES NFR BLD AUTO: 36.6 %
MAGNESIUM SERPL-MCNC: 2.1 MG/DL
MAN DIFF?: NORMAL
MCHC RBC-ENTMCNC: 31.1 PG
MCHC RBC-ENTMCNC: 33.1 GM/DL
MCV RBC AUTO: 93.9 FL
MONOCYTES # BLD AUTO: 0.61 K/UL
MONOCYTES NFR BLD AUTO: 9.3 %
NEUTROPHILS # BLD AUTO: 3.48 K/UL
NEUTROPHILS NFR BLD AUTO: 52.7 %
PLATELET # BLD AUTO: 237 K/UL
POTASSIUM SERPL-SCNC: 4.9 MMOL/L
PROT SERPL-MCNC: 7 G/DL
RBC # BLD: 3.79 M/UL
RBC # FLD: 12.7 %
SODIUM SERPL-SCNC: 139 MMOL/L
T4 FREE SERPL-MCNC: 1.2 NG/DL
THYROGLOB AB SERPL-ACNC: <20 IU/ML
THYROPEROXIDASE AB SERPL IA-ACNC: 26.7 IU/ML
TSH SERPL-ACNC: 2.09 UIU/ML
WBC # FLD AUTO: 6.59 K/UL

## 2020-07-01 ENCOUNTER — APPOINTMENT (OUTPATIENT)
Dept: INTERNAL MEDICINE | Facility: CLINIC | Age: 80
End: 2020-07-01
Payer: MEDICARE

## 2020-07-01 VITALS
TEMPERATURE: 98.1 F | HEIGHT: 58 IN | DIASTOLIC BLOOD PRESSURE: 70 MMHG | OXYGEN SATURATION: 98 % | BODY MASS INDEX: 20.57 KG/M2 | HEART RATE: 90 BPM | SYSTOLIC BLOOD PRESSURE: 120 MMHG | WEIGHT: 98 LBS

## 2020-07-01 PROCEDURE — 99214 OFFICE O/P EST MOD 30 MIN: CPT

## 2020-07-01 NOTE — ASSESSMENT
[FreeTextEntry1] : 1) HL\par - stable\par - ct meds \par - LDL - 80\par \par 2) Dementia\par - lives with family\par - good support \par - ct Donezpil \par - f/u neuro \par \par 3) OP \par - home safety discussed \par - no falls\par - ct meds / weight bearing exercise \par \par 4) OAB \par - controlled \par \par

## 2020-07-01 NOTE — HISTORY OF PRESENT ILLNESS
[FreeTextEntry1] : f/u HL / OP/ OAB / dementia [de-identified] : Taking all meds \par no AE \par  no chest pain   or SOB \par  no falls\par getting Prolia shots \par lives with family \par good support \par  passed away recently so she is upset abt that - poor appetite \par in fact has lost more weight \par exercise - walks - not daily though \par

## 2020-07-17 ENCOUNTER — APPOINTMENT (OUTPATIENT)
Dept: ENDOCRINOLOGY | Facility: CLINIC | Age: 80
End: 2020-07-17
Payer: MEDICARE

## 2020-07-17 VITALS
BODY MASS INDEX: 20.99 KG/M2 | HEART RATE: 70 BPM | HEIGHT: 58 IN | OXYGEN SATURATION: 96 % | TEMPERATURE: 98 F | WEIGHT: 100 LBS | DIASTOLIC BLOOD PRESSURE: 74 MMHG | SYSTOLIC BLOOD PRESSURE: 116 MMHG

## 2020-07-17 PROCEDURE — 99212 OFFICE O/P EST SF 10 MIN: CPT

## 2020-07-19 NOTE — HISTORY OF PRESENT ILLNESS
[FreeTextEntry1] : 79 yo F with PMH osteoporosis, prediabetes, HLD, iatrogenic menopause \par \par Was placed on fosamax approximately 7 years ago for presumed initial diagnosis of osteoporosis.\par She had a LENCHO BSO at age 37  with subsequent menopause. It was performed due to fibroids. \par She has been compliant with the medication. \par Sustained R posterior 6th rib fx 8/2017 after falling out of bed.\par no further fractures\par her mother did not have a hip fx\par never smoked\par no prolonged steroid use\par denies history of GERD\par DEXA 11/24/17 revealed osteopenia of the femoral neck with T score of -1.8, osteopenia of the total hip - 1.4, and osteoporosis of the lumbar spine with t score of -3, and osteoporosis of the distal 1/3 of the wrist with T score of  -2.8\par She received initial injection of prolia 3/14/18 - well-tolerated. she has received  4 injections\par DEXA 4/23/19 showed improvement in the distal 1/3 of the wrist to -2.1 and improvement of the spine to -2.7. Osteopenia of the hip noted. \par She takes calcium 600 mg QD and vitamin D 1000 IU QD\par no invasive dental work planned\par

## 2020-07-21 ENCOUNTER — APPOINTMENT (OUTPATIENT)
Dept: NEUROLOGY | Facility: CLINIC | Age: 80
End: 2020-07-21
Payer: MEDICARE

## 2020-07-21 VITALS
HEIGHT: 58 IN | BODY MASS INDEX: 20.99 KG/M2 | SYSTOLIC BLOOD PRESSURE: 141 MMHG | DIASTOLIC BLOOD PRESSURE: 65 MMHG | HEART RATE: 89 BPM | WEIGHT: 100 LBS

## 2020-07-21 VITALS — TEMPERATURE: 97.8 F

## 2020-07-21 DIAGNOSIS — I67.9 CEREBROVASCULAR DISEASE, UNSPECIFIED: ICD-10-CM

## 2020-07-21 PROCEDURE — 99215 OFFICE O/P EST HI 40 MIN: CPT

## 2020-07-29 ENCOUNTER — APPOINTMENT (OUTPATIENT)
Dept: INTERNAL MEDICINE | Facility: CLINIC | Age: 80
End: 2020-07-29
Payer: MEDICARE

## 2020-07-29 PROCEDURE — 77080 DXA BONE DENSITY AXIAL: CPT

## 2020-08-03 ENCOUNTER — APPOINTMENT (OUTPATIENT)
Dept: ENDOCRINOLOGY | Facility: CLINIC | Age: 80
End: 2020-08-03

## 2020-08-03 VITALS
HEIGHT: 58 IN | SYSTOLIC BLOOD PRESSURE: 136 MMHG | WEIGHT: 98 LBS | DIASTOLIC BLOOD PRESSURE: 68 MMHG | BODY MASS INDEX: 20.57 KG/M2 | TEMPERATURE: 97.6 F | OXYGEN SATURATION: 98 % | HEART RATE: 75 BPM

## 2020-08-08 RX ORDER — DENOSUMAB 60 MG/ML
60 INJECTION SUBCUTANEOUS
Qty: 1 | Refills: 0 | Status: ACTIVE | COMMUNITY
Start: 2020-08-08 | End: 1900-01-01

## 2020-08-08 RX ORDER — DENOSUMAB 60 MG/ML
60 INJECTION SUBCUTANEOUS
Refills: 0 | Status: DISCONTINUED | COMMUNITY
Start: 2018-09-11 | End: 2020-08-08

## 2020-09-01 ENCOUNTER — APPOINTMENT (OUTPATIENT)
Dept: ENDOCRINOLOGY | Facility: CLINIC | Age: 80
End: 2020-09-01

## 2020-09-02 ENCOUNTER — APPOINTMENT (OUTPATIENT)
Dept: ENDOCRINOLOGY | Facility: CLINIC | Age: 80
End: 2020-09-02
Payer: MEDICARE

## 2020-09-02 VITALS
OXYGEN SATURATION: 98 % | SYSTOLIC BLOOD PRESSURE: 120 MMHG | HEIGHT: 58 IN | HEART RATE: 69 BPM | WEIGHT: 98 LBS | BODY MASS INDEX: 20.57 KG/M2 | DIASTOLIC BLOOD PRESSURE: 70 MMHG | TEMPERATURE: 97.5 F

## 2020-09-02 PROCEDURE — 96372 THER/PROPH/DIAG INJ SC/IM: CPT

## 2020-09-02 PROCEDURE — 99212 OFFICE O/P EST SF 10 MIN: CPT | Mod: 25

## 2020-09-02 NOTE — HISTORY OF PRESENT ILLNESS
[FreeTextEntry1] : 81 yo F with PMH osteoporosis, prediabetes, HLD, iatrogenic menopause \par \par Was placed on fosamax approximately 7 years ago for presumed initial diagnosis of osteoporosis.\par She had a LENCHO BSO at age 37  with subsequent menopause. It was performed due to fibroids. \par She has been compliant with the medication. \par Sustained R posterior 6th rib fx 8/2017 after falling out of bed.\par no further fractures\par her mother did not have a hip fx\par never smoked\par no prolonged steroid use\par denies history of GERD\par DEXA 11/24/17 revealed osteopenia of the femoral neck with T score of -1.8, osteopenia of the total hip - 1.4, and osteoporosis of the lumbar spine with t score of -3, and osteoporosis of the distal 1/3 of the wrist with T score of  -2.8\par She received initial injection of prolia 3/14/18 - well-tolerated. she has received  4 injections\par DEXA 4/23/19 showed improvement in the distal 1/3 of the wrist to -2.1 and improvement of the spine to -2.7. Osteopenia of the hip noted. \par She takes calcium 600 mg QD and vitamin D 1000 IU QD\par no invasive dental work planned\par DEXA 7/29/20 T scores \par LS -2.7 --> -3.1\par FN -1.8 --> -2.4\par Tot Hip -1.5 --> -1.3 \par

## 2020-09-02 NOTE — ASSESSMENT
[FreeTextEntry1] : 79 yo F with PMH osteoporosis, prediabetes, HLD, iatrogenic menopause \par \par 1. Osteoporosis - previously on fosamax.   . Continue calcium  and vitamin D .  Repeat DEXA one year post initiation of prolia as above. Prolia administered today\par \par 2. Prediabetes -  Lifestyle modification\par

## 2020-09-02 NOTE — ADDENDUM
[FreeTextEntry1] : Prolia\par  LUQ\par gtin [id number]\par sn [id number]\par lot [id number]\par exp 10/22

## 2020-09-17 ENCOUNTER — APPOINTMENT (OUTPATIENT)
Dept: INTERNAL MEDICINE | Facility: CLINIC | Age: 80
End: 2020-09-17
Payer: MEDICARE

## 2020-09-17 DIAGNOSIS — Z23 ENCOUNTER FOR IMMUNIZATION: ICD-10-CM

## 2020-09-17 PROCEDURE — 90662 IIV NO PRSV INCREASED AG IM: CPT

## 2020-09-17 PROCEDURE — G0008: CPT

## 2020-12-21 PROBLEM — Z87.440 HISTORY OF URINARY TRACT INFECTION: Status: RESOLVED | Noted: 2018-06-11 | Resolved: 2020-12-21

## 2021-01-21 ENCOUNTER — APPOINTMENT (OUTPATIENT)
Dept: NEUROLOGY | Facility: CLINIC | Age: 81
End: 2021-01-21
Payer: MEDICARE

## 2021-01-21 VITALS
HEART RATE: 78 BPM | WEIGHT: 95 LBS | SYSTOLIC BLOOD PRESSURE: 150 MMHG | HEIGHT: 58 IN | DIASTOLIC BLOOD PRESSURE: 73 MMHG | BODY MASS INDEX: 19.94 KG/M2

## 2021-01-21 PROCEDURE — 99072 ADDL SUPL MATRL&STAF TM PHE: CPT

## 2021-01-21 PROCEDURE — 99214 OFFICE O/P EST MOD 30 MIN: CPT

## 2021-01-21 RX ORDER — ASPIRIN 81 MG/1
81 TABLET ORAL
Refills: 0 | Status: ACTIVE | COMMUNITY
Start: 2019-11-08

## 2021-02-05 RX ORDER — DENOSUMAB 60 MG/ML
60 INJECTION SUBCUTANEOUS
Qty: 1 | Refills: 3 | Status: ACTIVE | COMMUNITY
Start: 2021-02-05 | End: 1900-01-01

## 2021-02-23 LAB
25(OH)D3 SERPL-MCNC: 48.8 NG/ML
ALBUMIN SERPL ELPH-MCNC: 4.3 G/DL
ALP BLD-CCNC: 85 U/L
ALT SERPL-CCNC: 23 U/L
ANION GAP SERPL CALC-SCNC: 15 MMOL/L
AST SERPL-CCNC: 30 U/L
BILIRUB SERPL-MCNC: 0.2 MG/DL
BUN SERPL-MCNC: 18 MG/DL
CALCIUM SERPL-MCNC: 9.4 MG/DL
CHLORIDE SERPL-SCNC: 102 MMOL/L
CO2 SERPL-SCNC: 22 MMOL/L
CREAT SERPL-MCNC: 0.86 MG/DL
GLUCOSE SERPL-MCNC: 106 MG/DL
MAGNESIUM SERPL-MCNC: 2.2 MG/DL
POTASSIUM SERPL-SCNC: 4.8 MMOL/L
PROT SERPL-MCNC: 7 G/DL
SODIUM SERPL-SCNC: 139 MMOL/L

## 2021-03-10 ENCOUNTER — APPOINTMENT (OUTPATIENT)
Dept: ENDOCRINOLOGY | Facility: CLINIC | Age: 81
End: 2021-03-10
Payer: MEDICARE

## 2021-03-10 VITALS
HEIGHT: 58 IN | HEART RATE: 76 BPM | WEIGHT: 95 LBS | BODY MASS INDEX: 19.94 KG/M2 | SYSTOLIC BLOOD PRESSURE: 100 MMHG | DIASTOLIC BLOOD PRESSURE: 60 MMHG | OXYGEN SATURATION: 97 %

## 2021-03-10 PROCEDURE — 96372 THER/PROPH/DIAG INJ SC/IM: CPT

## 2021-03-10 PROCEDURE — 99072 ADDL SUPL MATRL&STAF TM PHE: CPT

## 2021-03-10 PROCEDURE — 99212 OFFICE O/P EST SF 10 MIN: CPT | Mod: 25

## 2021-03-11 NOTE — HISTORY OF PRESENT ILLNESS
[FreeTextEntry1] : 80 yo F with PMH osteoporosis, prediabetes, HLD, iatrogenic menopause \par \par Was placed on fosamax approximately 7 years ago for presumed initial diagnosis of osteoporosis.\par She had a LENCHO BSO at age 37  with subsequent menopause. It was performed due to fibroids. \par She has been compliant with the medication. \par Sustained R posterior 6th rib fx 8/2017 after falling out of bed.\par no further fractures\par her mother did not have a hip fx\par never smoked\par no prolonged steroid use\par denies history of GERD\par DEXA 11/24/17 revealed osteopenia of the femoral neck with T score of -1.8, osteopenia of the total hip - 1.4, and osteoporosis of the lumbar spine with t score of -3, and osteoporosis of the distal 1/3 of the wrist with T score of  -2.8\par She received initial injection of prolia 3/14/18 - well-tolerated. she has received  5 injections\par DEXA 4/23/19 showed improvement in the distal 1/3 of the wrist to -2.1 and improvement of the spine to -2.7. Osteopenia of the hip noted. \par She takes calcium 600 mg QD and vitamin D 1000 IU QD\par no invasive dental work planned\par DEXA 7/29/20 T scores \par LS -2.7 --> -3.1\par FN -1.8 --> -2.4\par Tot Hip -1.5 --> -1.3 \par

## 2021-03-11 NOTE — ADDENDUM
[FreeTextEntry1] : Prolia\par GTIN [id number]\par SN [id number]\par Lot [id number]\par Exp 05/23\par

## 2021-03-11 NOTE — ASSESSMENT
[FreeTextEntry1] : 82 yo F with PMH osteoporosis, prediabetes, HLD, iatrogenic menopause \par \par 1. Osteoporosis - previously on fosamax.  Continue calcium  and vitamin D .  Repeat DEXA one year post initiation of prolia as above. Prolia administered today\par \par 2. Prediabetes -  Lifestyle modification\par

## 2021-03-24 ENCOUNTER — APPOINTMENT (OUTPATIENT)
Dept: INTERNAL MEDICINE | Facility: CLINIC | Age: 81
End: 2021-03-24
Payer: MEDICARE

## 2021-03-24 VITALS
BODY MASS INDEX: 20.06 KG/M2 | SYSTOLIC BLOOD PRESSURE: 142 MMHG | TEMPERATURE: 98.2 F | DIASTOLIC BLOOD PRESSURE: 66 MMHG | HEART RATE: 62 BPM | OXYGEN SATURATION: 97 % | WEIGHT: 96 LBS

## 2021-03-24 PROCEDURE — G0439: CPT

## 2021-03-24 PROCEDURE — 99072 ADDL SUPL MATRL&STAF TM PHE: CPT

## 2021-03-24 NOTE — ASSESSMENT
[FreeTextEntry1] : 1) Wellness check \par \par - diet / exercise / home safety / fall prevention discussed \par - check labs\par - needs mammo \par - BMD through endo \par - colonoscopy- UTD \par - UTD vaccines except Shingrix- advised \par \par 2) AD \par - stable\par - reviewed note from neuro \par -PET - consistent w/ AD/ EEG - no epileptiform activity \par \par

## 2021-03-24 NOTE — HEALTH RISK ASSESSMENT
[Good] : ~his/her~  mood as  good [] : No [No] : No [No falls in past year] : Patient reported no falls in the past year [0] : 2) Feeling down, depressed, or hopeless: Not at all (0) [de-identified] : walks- intermittently  [de-identified] : regular [Patient reported mammogram was normal] : Patient reported mammogram was normal [Patient reported bone density results were abnormal] : Patient reported bone density results were abnormal [Change in mental status noted] : Change in mental status noted [Learning/Retaining New Information] : difficulty learning/retaining new information [Handling Complex Tasks] : difficulty handling complex tasks [None] : None [With Family] : lives with family [Retired] : retired [] :  [Sexually Active] : not sexually active [Feels Safe at Home] : Feels safe at home [Fully functional (bathing, dressing, toileting, transferring, walking, feeding)] : Fully functional (bathing, dressing, toileting, transferring, walking, feeding) [Reports changes in hearing] : Reports no changes in hearing [Reports changes in vision] : Reports changes in vision [Reports changes in dental health] : Reports no changes in dental health [Smoke Detector] : smoke detector [Carbon Monoxide Detector] : carbon monoxide detector [Safety elements used in home] : safety elements used in home [Seat Belt] :  uses seat belt [MammogramDate] : 2/2020 [BoneDensityDate] : 2019  [BoneDensityComments] : OP / recived Prolia earlier this month  [ColonoscopyDate] : 2016  [de-identified] : needs help w/ transport / shopping  [Name: ___] : Health Care Proxy's Name: [unfilled]  [Relationship: ___] : Relationship: [unfilled] [AdvancecareDate] : 3/24/2021

## 2021-03-24 NOTE — HISTORY OF PRESENT ILLNESS
[FreeTextEntry1] : wellness check \par \par lives w/ daughter \par good support \par still doing housework \par has had Covid vaccine\par no complaints\par no falls\par

## 2021-03-24 NOTE — PHYSICAL EXAM
[Normal Rate] : normal rate  [Regular Rhythm] : with a regular rhythm [Normal S1, S2] : normal S1 and S2 [No Edema] : there was no peripheral edema [Normal Supraclavicular Nodes] : no supraclavicular lymphadenopathy [Normal Posterior Cervical Nodes] : no posterior cervical lymphadenopathy [Normal Anterior Cervical Nodes] : no anterior cervical lymphadenopathy [Normal] : no rash [de-identified] : 3/6 systoloc murmur

## 2021-03-25 LAB
ALBUMIN SERPL ELPH-MCNC: 4.4 G/DL
ALP BLD-CCNC: 81 U/L
ALT SERPL-CCNC: 22 U/L
ANION GAP SERPL CALC-SCNC: 11 MMOL/L
AST SERPL-CCNC: 26 U/L
BASOPHILS # BLD AUTO: 0.02 K/UL
BASOPHILS NFR BLD AUTO: 0.4 %
BILIRUB SERPL-MCNC: <0.2 MG/DL
BUN SERPL-MCNC: 23 MG/DL
CALCIUM SERPL-MCNC: 9.6 MG/DL
CHLORIDE SERPL-SCNC: 103 MMOL/L
CHOLEST SERPL-MCNC: 169 MG/DL
CO2 SERPL-SCNC: 24 MMOL/L
CREAT SERPL-MCNC: 0.87 MG/DL
EOSINOPHIL # BLD AUTO: 0.06 K/UL
EOSINOPHIL NFR BLD AUTO: 1.1 %
ESTIMATED AVERAGE GLUCOSE: 114 MG/DL
GLUCOSE SERPL-MCNC: 124 MG/DL
HBA1C MFR BLD HPLC: 5.6 %
HCT VFR BLD CALC: 36.3 %
HDLC SERPL-MCNC: 85 MG/DL
HGB BLD-MCNC: 11.7 G/DL
IMM GRANULOCYTES NFR BLD AUTO: 0.2 %
LDLC SERPL CALC-MCNC: 75 MG/DL
LYMPHOCYTES # BLD AUTO: 2.11 K/UL
LYMPHOCYTES NFR BLD AUTO: 40.4 %
MAN DIFF?: NORMAL
MCHC RBC-ENTMCNC: 31.2 PG
MCHC RBC-ENTMCNC: 32.2 GM/DL
MCV RBC AUTO: 96.8 FL
MONOCYTES # BLD AUTO: 0.52 K/UL
MONOCYTES NFR BLD AUTO: 10 %
NEUTROPHILS # BLD AUTO: 2.5 K/UL
NEUTROPHILS NFR BLD AUTO: 47.9 %
NONHDLC SERPL-MCNC: 83 MG/DL
PLATELET # BLD AUTO: 251 K/UL
POTASSIUM SERPL-SCNC: 4.9 MMOL/L
PROT SERPL-MCNC: 7.3 G/DL
RBC # BLD: 3.75 M/UL
RBC # FLD: 12.9 %
SODIUM SERPL-SCNC: 138 MMOL/L
TRIGL SERPL-MCNC: 42 MG/DL
WBC # FLD AUTO: 5.22 K/UL

## 2021-03-26 LAB — TSH SERPL-ACNC: 2.12 UIU/ML

## 2021-03-30 ENCOUNTER — APPOINTMENT (OUTPATIENT)
Dept: MAMMOGRAPHY | Facility: IMAGING CENTER | Age: 81
End: 2021-03-30
Payer: MEDICARE

## 2021-03-30 ENCOUNTER — RESULT REVIEW (OUTPATIENT)
Age: 81
End: 2021-03-30

## 2021-03-30 ENCOUNTER — OUTPATIENT (OUTPATIENT)
Dept: OUTPATIENT SERVICES | Facility: HOSPITAL | Age: 81
LOS: 1 days | End: 2021-03-30
Payer: COMMERCIAL

## 2021-03-30 DIAGNOSIS — Z90.710 ACQUIRED ABSENCE OF BOTH CERVIX AND UTERUS: Chronic | ICD-10-CM

## 2021-03-30 DIAGNOSIS — E78.5 HYPERLIPIDEMIA, UNSPECIFIED: ICD-10-CM

## 2021-03-30 PROCEDURE — 77067 SCR MAMMO BI INCL CAD: CPT | Mod: 26

## 2021-03-30 PROCEDURE — 77063 BREAST TOMOSYNTHESIS BI: CPT

## 2021-03-30 PROCEDURE — 77067 SCR MAMMO BI INCL CAD: CPT

## 2021-03-30 PROCEDURE — 77063 BREAST TOMOSYNTHESIS BI: CPT | Mod: 26

## 2021-04-05 ENCOUNTER — OUTPATIENT (OUTPATIENT)
Dept: OUTPATIENT SERVICES | Facility: HOSPITAL | Age: 81
LOS: 1 days | End: 2021-04-05
Payer: COMMERCIAL

## 2021-04-05 ENCOUNTER — APPOINTMENT (OUTPATIENT)
Dept: MAMMOGRAPHY | Facility: IMAGING CENTER | Age: 81
End: 2021-04-05
Payer: MEDICARE

## 2021-04-05 ENCOUNTER — RESULT REVIEW (OUTPATIENT)
Age: 81
End: 2021-04-05

## 2021-04-05 DIAGNOSIS — Z90.710 ACQUIRED ABSENCE OF BOTH CERVIX AND UTERUS: Chronic | ICD-10-CM

## 2021-04-05 DIAGNOSIS — Z00.8 ENCOUNTER FOR OTHER GENERAL EXAMINATION: ICD-10-CM

## 2021-04-05 PROCEDURE — G0279: CPT | Mod: 26

## 2021-04-05 PROCEDURE — 77066 DX MAMMO INCL CAD BI: CPT

## 2021-04-05 PROCEDURE — G0279: CPT

## 2021-04-05 PROCEDURE — 77066 DX MAMMO INCL CAD BI: CPT | Mod: 26

## 2021-05-10 ENCOUNTER — OUTPATIENT (OUTPATIENT)
Dept: OUTPATIENT SERVICES | Facility: HOSPITAL | Age: 81
LOS: 1 days | End: 2021-05-10
Payer: COMMERCIAL

## 2021-05-10 ENCOUNTER — APPOINTMENT (OUTPATIENT)
Dept: CV DIAGNOSITCS | Facility: HOSPITAL | Age: 81
End: 2021-05-10

## 2021-05-10 DIAGNOSIS — E78.5 HYPERLIPIDEMIA, UNSPECIFIED: ICD-10-CM

## 2021-05-10 DIAGNOSIS — Z90.710 ACQUIRED ABSENCE OF BOTH CERVIX AND UTERUS: Chronic | ICD-10-CM

## 2021-05-10 PROCEDURE — 93306 TTE W/DOPPLER COMPLETE: CPT | Mod: 26

## 2021-05-10 PROCEDURE — 93306 TTE W/DOPPLER COMPLETE: CPT

## 2021-06-26 ENCOUNTER — RX RENEWAL (OUTPATIENT)
Age: 81
End: 2021-06-26

## 2021-07-20 ENCOUNTER — APPOINTMENT (OUTPATIENT)
Dept: NEUROLOGY | Facility: CLINIC | Age: 81
End: 2021-07-20
Payer: MEDICARE

## 2021-07-20 VITALS
SYSTOLIC BLOOD PRESSURE: 122 MMHG | WEIGHT: 96 LBS | BODY MASS INDEX: 20.15 KG/M2 | DIASTOLIC BLOOD PRESSURE: 69 MMHG | HEART RATE: 74 BPM | HEIGHT: 58 IN

## 2021-07-20 PROCEDURE — 99214 OFFICE O/P EST MOD 30 MIN: CPT | Mod: 25

## 2021-07-20 PROCEDURE — 96116 NUBHVL XM PHYS/QHP 1ST HR: CPT | Mod: 59

## 2021-07-21 ENCOUNTER — APPOINTMENT (OUTPATIENT)
Dept: INTERNAL MEDICINE | Facility: CLINIC | Age: 81
End: 2021-07-21
Payer: MEDICARE

## 2021-07-21 VITALS
TEMPERATURE: 99.7 F | OXYGEN SATURATION: 99 % | DIASTOLIC BLOOD PRESSURE: 72 MMHG | WEIGHT: 97 LBS | BODY MASS INDEX: 20.27 KG/M2 | HEART RATE: 96 BPM | SYSTOLIC BLOOD PRESSURE: 120 MMHG

## 2021-07-21 VITALS — HEART RATE: 82 BPM

## 2021-07-21 PROCEDURE — 99214 OFFICE O/P EST MOD 30 MIN: CPT

## 2021-07-21 NOTE — ASSESSMENT
[FreeTextEntry1] : 1)  HL\par - stable\par - ct meds \par \par 2) OAB \par - fair control\par - ct Myrbetriq\par \par 3) OP \par - home safety discussed \par - ct Prolia \par \par 4) A.D \par - ct Donepezil \par - environmental measures

## 2021-07-21 NOTE — HISTORY OF PRESENT ILLNESS
[FreeTextEntry1] : f/u OP / HL/ A.D\par \par accompanied by daughter \par no new complaints\par \par per daughter,  memory ct to be an issue but she could not tolerate inc in meds bc AE \par \par Labs reviewed \par 3/24/21\par Hb 11.7\par A1C -5.6\par LDL 75\par Cr 0.8\par LFT - nl\par TSH 2.12\par VIT D - nl\par \par mammo- 4/5 /21- neg \par \par reviewed neuro note - 7/20/21\par

## 2021-08-23 ENCOUNTER — APPOINTMENT (OUTPATIENT)
Dept: UROLOGY | Facility: CLINIC | Age: 81
End: 2021-08-23
Payer: MEDICARE

## 2021-08-23 PROCEDURE — 99212 OFFICE O/P EST SF 10 MIN: CPT

## 2021-08-26 NOTE — HISTORY OF PRESENT ILLNESS
[FreeTextEntry1] : 80yo female with cc of OAB. Pt was seen by Dr Gonzalez in the past for UTI. Seen by me for OAB. At baseline, has some urinary frequency. Mostly 1-2 times per night. Occasional 3x. Has some daytime frequency but this is mostly dependent on fluid intake. Had urgency in the past but none recently. No straining or feelings of incomplete emptying. Had been on oxybutynin with above benefits. On this for about 2y. She was seen last year however and had new issues with memory and saw neuro. Dx with vascular dementia on aricept. Neuro discussed d/c oxybutynin for concerns adding to altered mental status. She was changed to myrbetriq with benefit. \par \par She continues to do well with the bladder medication. Has nocturia x1. No UTIs. No urinary incontinence.

## 2021-09-13 ENCOUNTER — APPOINTMENT (OUTPATIENT)
Dept: ENDOCRINOLOGY | Facility: CLINIC | Age: 81
End: 2021-09-13

## 2021-10-20 ENCOUNTER — APPOINTMENT (OUTPATIENT)
Dept: INTERNAL MEDICINE | Facility: CLINIC | Age: 81
End: 2021-10-20

## 2021-10-25 NOTE — ASSESSMENT
Caller: Viral Bach    Relationship: Self      Medication requested (name and dosage): LISINOPRIL       Pharmacy where request should be sent: [id number] - [place], [place] - [address] AT [facility] [address] &  - [phone]  - [phone] FX  [phone]    Additional details provided by patient: PATIENT WOULD LIKE A CALL BACK TO LET HIM KNOW THIS HAS BEEN SENT TO THE PHARMACY PLEASE ADVISE THANK YOU.       Best call back number: [phone]     Does the patient have less than a 3 day supply:  [x] Yes  [] No    Loretta Medellin Rep   10/25/21 12:58 EDT      [FreeTextEntry1] : 79 yo F with PMH osteoporosis, prediabetes, HLD, iatrogenic menopause \par \par 1. Osteoporosis - previously on fosamax. willl refer for DEXA - to decide on drug holiday at that time.  . Continue calcium  and vitamin D .  Repeat DEXA one year post initiation of prolia as above. \par \par 2. Prediabetes -  Lifestyle modification\par

## 2022-06-08 ENCOUNTER — APPOINTMENT (OUTPATIENT)
Dept: UROLOGY | Facility: CLINIC | Age: 82
End: 2022-06-08

## 2022-06-29 ENCOUNTER — APPOINTMENT (OUTPATIENT)
Dept: NEUROLOGY | Facility: CLINIC | Age: 82
End: 2022-06-29

## 2022-08-02 ENCOUNTER — APPOINTMENT (OUTPATIENT)
Dept: INTERNAL MEDICINE | Facility: CLINIC | Age: 82
End: 2022-08-02

## 2022-08-02 VITALS
SYSTOLIC BLOOD PRESSURE: 149 MMHG | DIASTOLIC BLOOD PRESSURE: 77 MMHG | OXYGEN SATURATION: 98 % | TEMPERATURE: 98.1 F | WEIGHT: 94 LBS | HEIGHT: 58 IN | BODY MASS INDEX: 19.73 KG/M2 | HEART RATE: 78 BPM

## 2022-08-02 PROCEDURE — G0439: CPT

## 2022-08-02 NOTE — HEALTH RISK ASSESSMENT
[Good] : ~his/her~  mood as  good [Never] : Never [No] : No [No falls in past year] : Patient reported no falls in the past year [0] : 2) Feeling down, depressed, or hopeless: Not at all (0) [de-identified] : minimal [de-identified] : regular  [Patient reported bone density results were abnormal] : Patient reported bone density results were abnormal [Change in mental status noted] : No change in mental status noted [Transportation] : transportation [With Family] : lives with family [] :  [Sexually Active] : not sexually active [Feels Safe at Home] : Feels safe at home [Fully functional (bathing, dressing, toileting, transferring, walking, feeding)] : Fully functional (bathing, dressing, toileting, transferring, walking, feeding) [Reports changes in hearing] : Reports no changes in hearing [Reports changes in vision] : Reports no changes in vision [Reports changes in dental health] : Reports no changes in dental health [Smoke Detector] : smoke detector [Carbon Monoxide Detector] : carbon monoxide detector [Safety elements used in home] : safety elements used in home [Seat Belt] :  uses seat belt [MammogramDate] : 2021 [BoneDensityDate] : 2019  [BoneDensityComments] : osteoporosis  [ColonoscopyDate] : 4939 [de-identified] : needs help w/ shopping / transport [Name: ___] : Health Care Proxy's Name: [unfilled]  [Relationship: ___] : Relationship: [unfilled] [AdvancecareDate] : 8/2/22

## 2022-08-02 NOTE — INTERPRETER SERVICES
[Patient Declined  Services] : - None: Patient declined  services [FreeTextEntry3] : no interpretor needed

## 2022-08-02 NOTE — ASSESSMENT
[FreeTextEntry1] : 1) Wellness check\par \par - diet /exercise / fall prevention discussed w /pt and daughter \par - check labs\par - mammo / BMD ordered , endo f/u \par - no CRC screening needed \par - vaccines UTD \par - optho \par \par \par 2) murmur\par - no S/S of CHF\par - Echo 2021- moderate AS \par \par 3) Dementia\par - still quit independent w/ ADL \par - needs help w/ transport / shopping \par \par

## 2022-08-02 NOTE — CURRENT MEDS
[Takes medication as prescribed] : does not take [FreeTextEntry1] : not taking prolia- need endo follow up

## 2022-08-02 NOTE — PHYSICAL EXAM
[Normal Sclera/Conjunctiva] : normal sclera/conjunctiva [Normal Outer Ear/Nose] : the outer ears and nose were normal in appearance [No JVD] : no jugular venous distention [Normal Rate] : normal rate  [Regular Rhythm] : with a regular rhythm [Normal S1, S2] : normal S1 and S2 [No Edema] : there was no peripheral edema [Declined Rectal Exam] : declined rectal exam [Normal] : affect was normal and insight and judgment were intact [de-identified] : systolic murmur

## 2022-08-03 LAB
25(OH)D3 SERPL-MCNC: 60 NG/ML
ALBUMIN SERPL ELPH-MCNC: 4.6 G/DL
ALP BLD-CCNC: 90 U/L
ALT SERPL-CCNC: 19 U/L
ANION GAP SERPL CALC-SCNC: 13 MMOL/L
AST SERPL-CCNC: 28 U/L
BASOPHILS # BLD AUTO: 0.03 K/UL
BASOPHILS NFR BLD AUTO: 0.5 %
BILIRUB SERPL-MCNC: 0.2 MG/DL
BUN SERPL-MCNC: 19 MG/DL
CALCIUM SERPL-MCNC: 9.7 MG/DL
CHLORIDE SERPL-SCNC: 100 MMOL/L
CHOLEST SERPL-MCNC: 167 MG/DL
CO2 SERPL-SCNC: 26 MMOL/L
CREAT SERPL-MCNC: 0.71 MG/DL
EGFR: 86 ML/MIN/1.73M2
EOSINOPHIL # BLD AUTO: 0.08 K/UL
EOSINOPHIL NFR BLD AUTO: 1.3 %
ESTIMATED AVERAGE GLUCOSE: 111 MG/DL
GLUCOSE SERPL-MCNC: 95 MG/DL
HBA1C MFR BLD HPLC: 5.5 %
HCT VFR BLD CALC: 35.3 %
HDLC SERPL-MCNC: 81 MG/DL
HGB BLD-MCNC: 11.7 G/DL
IMM GRANULOCYTES NFR BLD AUTO: 0.2 %
LDLC SERPL CALC-MCNC: 75 MG/DL
LYMPHOCYTES # BLD AUTO: 1.98 K/UL
LYMPHOCYTES NFR BLD AUTO: 31.8 %
MAN DIFF?: NORMAL
MCHC RBC-ENTMCNC: 31.5 PG
MCHC RBC-ENTMCNC: 33.1 GM/DL
MCV RBC AUTO: 95.1 FL
MONOCYTES # BLD AUTO: 0.6 K/UL
MONOCYTES NFR BLD AUTO: 9.6 %
NEUTROPHILS # BLD AUTO: 3.52 K/UL
NEUTROPHILS NFR BLD AUTO: 56.6 %
NONHDLC SERPL-MCNC: 86 MG/DL
PLATELET # BLD AUTO: 260 K/UL
POTASSIUM SERPL-SCNC: 5.2 MMOL/L
PROT SERPL-MCNC: 7.2 G/DL
RBC # BLD: 3.71 M/UL
RBC # FLD: 13.2 %
SODIUM SERPL-SCNC: 138 MMOL/L
TRIGL SERPL-MCNC: 57 MG/DL
TSH SERPL-ACNC: 1.74 UIU/ML
WBC # FLD AUTO: 6.22 K/UL

## 2022-08-05 NOTE — ASSESSMENT
HI, ANY UPDATES ON TPI?   Juan Chaidez [FreeTextEntry1] : The patient is a 79 year old right handed woman with progressive cognitive decline since approx 2014, affecting IADLs. On neurobehavioral status testing she had impaired attention, working memory, exec function, and memory registration and recall with only partial response to cues and false positives, conversational memory with recent events required prompts and then still vague, similar phonemic and semantic fluency. Word production anomia. Praxis spared aside from 1 meaningless gesture. there was some psychomotor slowing. \par \par Counseled that she has dementia of mild to moderate stage. Etiology seems like may be a mixed dementia due to mixed cognitive pattern and the mild-moderate microvascular ischemic disease seen on MRI as well as the atrophy pattern affecting biparietal and sylvian fissures more than the rest. I recommend FDG PET scan to clarify. Exec fxn deficits are prominent so FTD is on ddx though i think less likely, would be more related to the vascular dementia component.\par \par We discussed other possible contributors to her symptoms, including anticholinergic effects of oxybuytinin, possible GORAN given snoring and nocturia and cognitive pattern on testing. \par \par Counseled on natural history of mixed dementia, lack of curative drugs, FDA approved medications for AD and their risks/benefits/expectations. I recommend she start asa 81mg daily. Will likely start aricept once clarify etiology with FDG PET scan.\par \par Counseled on strategies for maintaining cognitive health. Counseled on safety concerns.

## 2022-08-09 ENCOUNTER — APPOINTMENT (OUTPATIENT)
Dept: UROLOGY | Facility: CLINIC | Age: 82
End: 2022-08-09

## 2022-08-09 ENCOUNTER — APPOINTMENT (OUTPATIENT)
Age: 82
End: 2022-08-09

## 2022-08-09 VITALS
DIASTOLIC BLOOD PRESSURE: 78 MMHG | SYSTOLIC BLOOD PRESSURE: 143 MMHG | HEIGHT: 59 IN | BODY MASS INDEX: 18.95 KG/M2 | OXYGEN SATURATION: 17 % | WEIGHT: 94 LBS | RESPIRATION RATE: 72 BRPM

## 2022-08-09 PROCEDURE — 99213 OFFICE O/P EST LOW 20 MIN: CPT

## 2022-08-09 PROCEDURE — 51798 US URINE CAPACITY MEASURE: CPT

## 2022-08-09 NOTE — HISTORY OF PRESENT ILLNESS
[FreeTextEntry1] : Patient is a 83 yo F who presents for f/u of OAB.\par She previously saw Dr Finn, who is on leave currently.\par She is taking myrbetriq 50 mg daily.  Doing well on this regimen.  Mild nocturia x1.  No significant DTF or urgency.  No dysuria or hematuria.\par \par Prior hx:\par Pt was seen by Dr Gonzalez in the past for UTI. Seen by me for OAB. At baseline, has some urinary frequency. Mostly 1-2 times per night. Occasional 3x. Has some daytime frequency but this is mostly dependent on fluid intake. Had urgency in the past but none recently. No straining or feelings of incomplete emptying. Had been on oxybutynin with above benefits. On this for about 2y. She was seen last year however and had new issues with memory and saw neuro. Dx with vascular dementia on aricept. Neuro discussed d/c oxybutynin for concerns adding to altered mental status. She was changed to myrbetriq with benefit. \par \par

## 2022-08-09 NOTE — PHYSICAL EXAM
[General Appearance - Well Developed] : well developed [General Appearance - Well Nourished] : well nourished [Normal Appearance] : normal appearance [Well Groomed] : well groomed [General Appearance - In No Acute Distress] : no acute distress [Urinary Bladder Findings] : the bladder was normal on palpation [] : no respiratory distress [Respiration, Rhythm And Depth] : normal respiratory rhythm and effort [Exaggerated Use Of Accessory Muscles For Inspiration] : no accessory muscle use

## 2022-08-09 NOTE — ASSESSMENT
[FreeTextEntry1] : Patient is a 83 yo F who presents for f/u of OAB.\par \par Doing well on myrbetriq - rx renewed\par PVR low\par F/u 1 yr for med refill with Dr Finn

## 2022-08-18 ENCOUNTER — APPOINTMENT (OUTPATIENT)
Dept: NEUROLOGY | Facility: CLINIC | Age: 82
End: 2022-08-18

## 2022-08-18 VITALS
WEIGHT: 94 LBS | SYSTOLIC BLOOD PRESSURE: 135 MMHG | HEART RATE: 73 BPM | DIASTOLIC BLOOD PRESSURE: 78 MMHG | BODY MASS INDEX: 18.95 KG/M2 | HEIGHT: 59 IN

## 2022-08-18 PROCEDURE — 99214 OFFICE O/P EST MOD 30 MIN: CPT

## 2022-08-18 NOTE — ASSESSMENT
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 No behavioral concerns\par Continue Aricept 5 mg QD (could not tolerate higher doses)\par Continue ASA 81 mg QD\par Encouraged healthy diet, regular exercise, social interaction and cognitive stimulation (reading, word puzzles). \par \par Counselled on the diagnosis of Alzheimer’s dementia (AD). Discussed the natural course of AD. As the disease progresses, there is increasing impairment in cognitive functioning, including memory, language, and executive functioning. Behavioral changes also progress as the disease advances, as does one’s ability to function independently; eventually requiring full time assistance with daily needs. Counselled on safety and future planning. \par There is no curative treatment, however there are therapies available that can help temporarily mitigate cognitive symptoms of AD, but do not halt the overall progression of disease. \par \par RTC 1 year, or sooner if needed. \par

## 2022-08-18 NOTE — HISTORY OF PRESENT ILLNESS
[FreeTextEntry1] : HPI (initial visit Aug 18, 2022)- 82 F w/ hx of mixed Alzheimer and vascular dementia, previously followed by Jagdish Mckeon, and I am seeing her in clinic for the first time since Dr. Mckeon has left the practice. Last seen by Dr. Mckeon 7/2021. Reviewed chart notes. SHe has mild-moderate dementia and has been on donepezil 5 mg QD.\par She is accompanied by daughter, Leslye. She lives with her daughter. She does not drive. She reports she had been seeing Dr. Mckeon for her "memory".  Daughter manages her medication. She never drove. She is from [facility]. She is eating and sleeping well. She enjoys walking and reading and watching TV (cooking shows). She does cook [place] food- no issues with cooking , not leaving stove on.\par Daughter report her memory has gotten a little worse. No side effects to donepezil, 10 mg dose made her feel dizzy. NO wandering. No behavioral or mood changes. \par \par MRI brain 7/2019 with evidence of mild- mod chronic microvascular ischemic disease and mild atrophy\par EEG 2019- mild slowing. \par FDG PET scan consistent with Alzheimer disease

## 2022-08-18 NOTE — PHYSICAL EXAM
[FreeTextEntry1] : Pleasant elderly female\par conversational memory intact\par No anomia\par Oriented to self, , Month, year. Not date. Not place ("[place]"). She knows she in a "medical building"\par Immediate recall 3/3\par Delayed recall  2/3\par Able to spell WORLD backwards\par Intact repetition, able to follow 3 step commands, intact naming\par Gait is slow and cautious. No shuffling. Normal arm swing.

## 2022-08-24 ENCOUNTER — APPOINTMENT (OUTPATIENT)
Dept: UROLOGY | Facility: CLINIC | Age: 82
End: 2022-08-24

## 2022-09-20 ENCOUNTER — RX RENEWAL (OUTPATIENT)
Age: 82
End: 2022-09-20

## 2022-12-06 ENCOUNTER — APPOINTMENT (OUTPATIENT)
Dept: CARE COORDINATION | Facility: HOME HEALTH | Age: 82
End: 2022-12-06

## 2022-12-06 PROCEDURE — 99349 HOME/RES VST EST MOD MDM 40: CPT | Mod: 95

## 2022-12-06 NOTE — HISTORY OF PRESENT ILLNESS
[Home] : at home, [unfilled] , at the time of the visit. [Other Location: e.g. Home (Enter Location, City,State)___] : at [unfilled] [Family Member] : family member [FreeTextEntry4] : pt's best Davidt [FreeTextEntry3] : no recent hospitalizations or f/u medical appts  [FreeTextEntry2] : [facility] Annual f/u telehealth visit done. Pt's tr Leslye ws unable to connect to [facility] she did not receive invitation sent by email or to her cell phone. Visit done via face time at [facility]'s cell number [phone]. Dgtr provided most of the answers to the questions. Pt is alert and oriented to self and surroundings. No acute distress,, breathing normally on room air. She  lives with family. Dgtr reports pt has been doing well. Both deny CP, SOB, vision changes,nausea, vomiting, diarrhea, constipation, falls, edema. Denies any recent changes in health status. Reports appetite is intact/, sleeping all night. Regular bowel movements. Skin intact with normal color for skin tone. Ambulates indoors with out any assistive device. \par Although pt has been diagnosed with vascular dementia pt has no behavioral issues. She is independent in all ADL and most IADLs.  Med rec done:  pt/caregiver reports they have all medications.\par Reviewed presenting dx of vascular disease, Fadumo and Parkinson's disease. Dgtr denies pt has any of those conditions, denies pt taking any medications to support and no lab test has been done to diagnose these medical conditions. Pt's last labs from 8/22 or all within normal range. She has all providers recommending 6 mths to 1 yr f/u. her next PCP appt scheduled for Fe, 2023. \par

## 2022-12-06 NOTE — REVIEW OF SYSTEMS
[Fever] : no fever [Fatigue] : fatigue [Vision Problems] : no vision problems [Hearing Loss] : no hearing loss [Lower Ext Edema] : no lower extremity edema [Shortness Of Breath] : no shortness of breath [Wheezing] : no wheezing [Cough] : no cough [Nausea] : no nausea [Constipation] : no constipation [Vomiting] : no vomiting [Incontinence] : no incontinence [Joint Pain] : no joint pain [Joint Stiffness] : no joint stiffness [Muscle Weakness] : no muscle weakness [Muscle Pain] : no muscle pain [Back Pain] : no back pain [Skin Rash] : no skin rash [Unsteady Walking] : no ataxia [Suicidal] : not suicidal [Insomnia] : no insomnia [Anxiety] : no anxiety [Depression] : no depression [Easy Bleeding] : no easy bleeding [Negative] : Heme/Lymph

## 2022-12-06 NOTE — HISTORY OF PRESENT ILLNESS
[Home] : at home, [unfilled] , at the time of the visit. [Other Location: e.g. Home (Enter Location, City,State)___] : at [unfilled] [Family Member] : family member [FreeTextEntry4] : pt's best Davidt [FreeTextEntry3] : no recent hospitalizations or f/u medical appts  [FreeTextEntry2] : [facility] Annual f/u telehealth visit done. Pt's tr Leslye ws unable to connect to [facility] she did not receive invitation sent by email or to her cell phone. Visit done via face time at [facility]'s cell number [phone]. Dgtr provided most of the answers to the questions. Pt is alert and oriented to self and surroundings. No acute distress,, breathing normally on room air. She  lives with family. Dgtr reports pt has been doing well. Both deny CP, SOB, vision changes,nausea, vomiting, diarrhea, constipation, falls, edema. Denies any recent changes in health status. Reports appetite is intact/, sleeping all night. Regular bowel movements. Skin intact with normal color for skin tone. Ambulates indoors with out any assistive device. \par Although pt has been diagnosed with vascular dementia pt has no behavioral issues. She is independent in all ADL and most IADLs.  Med rec done:  pt/caregiver reports they have all medications.\par Reviewed presenting dx of vascular disease, Fadumo and Parkinson's disease. Dgtr denies pt has any of those conditions, denies pt taking any medications to support and no lab test has been done to diagnose these medical conditions. Pt's last labs from 8/22 or all within normal range. She has all providers recommending 6 mths to 1 yr f/u. her next PCP appt scheduled for Fe, 2023. \par

## 2022-12-06 NOTE — HEALTH RISK ASSESSMENT
[Strict Adherence] : strict adherence [FreeTextEntry1] : regular diet [Learning/Retaining New Information] : difficulty learning/retaining new information [None] : None [With Family] : lives with family [Fully functional (bathing, dressing, toileting, transferring, walking, feeding)] : Fully functional (bathing, dressing, toileting, transferring, walking, feeding) [Independent] : feeding [Some assistance needed] : managing medications [Full assistance needed] : managing finances [Language] : denies difficulty with language [Behavior] : denies difficulty with behavior [Spatial Ability and Orientation] : denies difficulty with spatial ability and orientation

## 2023-05-24 ENCOUNTER — APPOINTMENT (OUTPATIENT)
Dept: INTERNAL MEDICINE | Facility: CLINIC | Age: 83
End: 2023-05-24
Payer: MEDICARE

## 2023-05-24 VITALS
TEMPERATURE: 98.5 F | DIASTOLIC BLOOD PRESSURE: 72 MMHG | HEART RATE: 82 BPM | WEIGHT: 97 LBS | SYSTOLIC BLOOD PRESSURE: 124 MMHG | OXYGEN SATURATION: 95 % | BODY MASS INDEX: 19.59 KG/M2

## 2023-05-24 DIAGNOSIS — G23.8 OTHER SPECIFIED DEGENERATIVE DISEASES OF BASAL GANGLIA: ICD-10-CM

## 2023-05-24 PROCEDURE — 99214 OFFICE O/P EST MOD 30 MIN: CPT

## 2023-05-24 NOTE — PHYSICAL EXAM
[No Acute Distress] : no acute distress [Well Nourished] : well nourished [Normal Sclera/Conjunctiva] : normal sclera/conjunctiva [Normal Rate] : normal rate  [Normal] : soft, non-tender, non-distended, no masses palpated, no HSM and normal bowel sounds [de-identified] : SYSTOLIC MURMUR

## 2023-05-24 NOTE — HISTORY OF PRESENT ILLNESS
[FreeTextEntry1] : f.u \par \par h/o dementia , HL , OP , OAB \par taking her meds as adv \par no falls\par needs help w/ IADL - like cleaning / shopping / cooking / laundry \par can do her ADL \par - No assistive devices \par the pt denies chest pain or SOB \par \par lives / w daughter Leslye who has accompanied her to the office 
5

## 2023-05-24 NOTE — ASSESSMENT
[FreeTextEntry1] : 1) HL \par - ct statin\par - check lipid panel \par \par 2) Dementia - vasc / AD\par - stable, no disruptive behavior\par - independent w/ ADL\par - ct supportive care \par - ASA / statin  for prevention of vascular events \par \par 3) OP\par - home safety discussed \par - adv weight bearing exercise - walking \par  endo follow up \par \par 4) OAB \par - ct Myrbetriq \par \par 5 ) AS\par - Moderate on echo- 2021\par - no new symptoms \par - f/u echo \par

## 2023-05-25 LAB
ALBUMIN SERPL ELPH-MCNC: 4.5 G/DL
ALP BLD-CCNC: 94 U/L
ALT SERPL-CCNC: 17 U/L
ANION GAP SERPL CALC-SCNC: 12 MMOL/L
AST SERPL-CCNC: 25 U/L
BILIRUB SERPL-MCNC: <0.2 MG/DL
BUN SERPL-MCNC: 26 MG/DL
CALCIUM SERPL-MCNC: 9.8 MG/DL
CHLORIDE SERPL-SCNC: 105 MMOL/L
CHOLEST SERPL-MCNC: 184 MG/DL
CO2 SERPL-SCNC: 27 MMOL/L
CREAT SERPL-MCNC: 0.75 MG/DL
EGFR: 80 ML/MIN/1.73M2
GLUCOSE SERPL-MCNC: 126 MG/DL
HDLC SERPL-MCNC: 90 MG/DL
LDLC SERPL CALC-MCNC: 84 MG/DL
NONHDLC SERPL-MCNC: 94 MG/DL
POTASSIUM SERPL-SCNC: 5.1 MMOL/L
PROT SERPL-MCNC: 7.3 G/DL
SODIUM SERPL-SCNC: 144 MMOL/L
TRIGL SERPL-MCNC: 51 MG/DL

## 2023-06-30 NOTE — ED ADULT NURSE NOTE - MUSCULOSKELETAL WDL
Full range of motion of upper and lower extremities, no joint tenderness/swelling. No. ANGEL screening performed.  STOP BANG Legend: 0-2 = LOW Risk; 3-4 = INTERMEDIATE Risk; 5-8 = HIGH Risk

## 2023-08-08 ENCOUNTER — APPOINTMENT (OUTPATIENT)
Dept: CV DIAGNOSITCS | Facility: HOSPITAL | Age: 83
End: 2023-08-08

## 2023-08-08 ENCOUNTER — OUTPATIENT (OUTPATIENT)
Dept: OUTPATIENT SERVICES | Facility: HOSPITAL | Age: 83
LOS: 1 days | End: 2023-08-08
Payer: MEDICARE

## 2023-08-08 DIAGNOSIS — Z90.710 ACQUIRED ABSENCE OF BOTH CERVIX AND UTERUS: Chronic | ICD-10-CM

## 2023-08-08 DIAGNOSIS — R01.1 CARDIAC MURMUR, UNSPECIFIED: ICD-10-CM

## 2023-08-08 PROCEDURE — 93306 TTE W/DOPPLER COMPLETE: CPT | Mod: 26

## 2023-08-09 ENCOUNTER — APPOINTMENT (OUTPATIENT)
Dept: UROLOGY | Facility: CLINIC | Age: 83
End: 2023-08-09
Payer: MEDICARE

## 2023-08-09 VITALS
SYSTOLIC BLOOD PRESSURE: 131 MMHG | RESPIRATION RATE: 16 BRPM | DIASTOLIC BLOOD PRESSURE: 78 MMHG | OXYGEN SATURATION: 99 % | HEART RATE: 69 BPM

## 2023-08-09 DIAGNOSIS — N32.81 OVERACTIVE BLADDER: ICD-10-CM

## 2023-08-09 DIAGNOSIS — R35.0 FREQUENCY OF MICTURITION: ICD-10-CM

## 2023-08-09 PROCEDURE — 99213 OFFICE O/P EST LOW 20 MIN: CPT

## 2023-08-09 NOTE — HISTORY OF PRESENT ILLNESS
[FreeTextEntry1] : 84yo female with cc of OAB and hx of UTI. Pt was seen by Dr Gonzalez last year for acute change in sx. She was treated with keflex and cipro. Had urine tests and US and cysto that were normal. At baseline, has some urinary frequency. Mostly 1-2 times per night. Occasional 3x. Has some daytime frequency but this is mostly dependent on fluid intake. Had urgency in the past but none recently. No straining or feelings of incomplete emptying. Was on oxybutynin with above benefits. She developed vascular dementia and given concerns with oxybutynin, was changed to myrbetriq. Seen by Dr Thornton last year in my absence.   Returns today for follow-up. Still with 1-2 times per night. No daytiome bother on the medication. Going on 3-4 times in the day. Drinks decaf tea. Dementia stable per pt and daughter. No issues with constipation. No infections this year

## 2023-08-16 ENCOUNTER — APPOINTMENT (OUTPATIENT)
Dept: INTERNAL MEDICINE | Facility: CLINIC | Age: 83
End: 2023-08-16
Payer: MEDICARE

## 2023-08-16 VITALS
HEART RATE: 73 BPM | DIASTOLIC BLOOD PRESSURE: 69 MMHG | TEMPERATURE: 98 F | SYSTOLIC BLOOD PRESSURE: 121 MMHG | BODY MASS INDEX: 19.56 KG/M2 | OXYGEN SATURATION: 98 % | WEIGHT: 97 LBS | HEIGHT: 59 IN

## 2023-08-16 DIAGNOSIS — R01.1 CARDIAC MURMUR, UNSPECIFIED: ICD-10-CM

## 2023-08-16 DIAGNOSIS — L84 CORNS AND CALLOSITIES: ICD-10-CM

## 2023-08-16 DIAGNOSIS — Z00.00 ENCOUNTER FOR GENERAL ADULT MEDICAL EXAMINATION W/OUT ABNORMAL FINDINGS: ICD-10-CM

## 2023-08-16 PROCEDURE — G0439: CPT

## 2023-08-16 NOTE — PHYSICAL EXAM
[No Acute Distress] : no acute distress [Normal Voice/Communication] : normal voice/communication [Normal Sclera/Conjunctiva] : normal sclera/conjunctiva [Normal Outer Ear/Nose] : the outer ears and nose were normal in appearance [Regular Rhythm] : with a regular rhythm [Normal Rate] : normal rate  [Normal S1, S2] : normal S1 and S2 [No Edema] : there was no peripheral edema [Normal] : no joint swelling and grossly normal strength and tone [de-identified] : systoloc murmor

## 2023-08-16 NOTE — ASSESSMENT
[FreeTextEntry1] : 1) Wellness check   -diet / exercise discussed  - home safety/ fall prevention discussed  - check labs - CRC / mammo/. pap -aged out  - BMD pending  - vacines- adv flu / Covid /RSV   2) HL - ct meds   3) systolic murmur - get acho report

## 2023-08-16 NOTE — HEALTH RISK ASSESSMENT
[Good] : ~his/her~  mood as  good [No] : No [No falls in past year] : Patient reported no falls in the past year [0] : 2) Feeling down, depressed, or hopeless: Not at all (0) [de-identified] : none [de-identified] : regular  [Change in mental status noted] : No change in mental status noted [None] : None [With Family] : lives with family [Retired] : retired [] :  [Sexually Active] : not sexually active [Feels Safe at Home] : Feels safe at home [Fully functional (bathing, dressing, toileting, transferring, walking, feeding)] : Fully functional (bathing, dressing, toileting, transferring, walking, feeding) [Reports changes in hearing] : Reports no changes in hearing [Reports changes in vision] : Reports no changes in vision [Reports changes in dental health] : Reports no changes in dental health [Smoke Detector] : smoke detector [Carbon Monoxide Detector] : carbon monoxide detector [Safety elements used in home] : safety elements used in home [Seat Belt] :  uses seat belt [MammogramComments] : aged out - refuses  [PapSmearComments] : aged - refuses  [BoneDensityComments] : needed [ColonoscopyComments] : refuses  [de-identified] : needs help w/ laundry / transport [Name: ___] : Health Care Proxy's Name: [unfilled]  [Relationship: ___] : Relationship: [unfilled] [AdvancecareDate] : 8/16/23 [Never] : Never

## 2023-08-16 NOTE — HISTORY OF PRESENT ILLNESS
[Family Member] : family member [FreeTextEntry1] : wellness check   no complaints  reviewed urology note   accompanied by daughter  Leslye

## 2023-08-17 LAB
ALBUMIN SERPL ELPH-MCNC: 4.5 G/DL
ALP BLD-CCNC: 90 U/L
ALT SERPL-CCNC: 21 U/L
ANION GAP SERPL CALC-SCNC: 13 MMOL/L
AST SERPL-CCNC: 30 U/L
BILIRUB SERPL-MCNC: <0.2 MG/DL
BUN SERPL-MCNC: 21 MG/DL
CALCIUM SERPL-MCNC: 9.6 MG/DL
CHLORIDE SERPL-SCNC: 104 MMOL/L
CHOLEST SERPL-MCNC: 160 MG/DL
CO2 SERPL-SCNC: 25 MMOL/L
CREAT SERPL-MCNC: 0.75 MG/DL
EGFR: 80 ML/MIN/1.73M2
ESTIMATED AVERAGE GLUCOSE: 111 MG/DL
GLUCOSE SERPL-MCNC: 85 MG/DL
HBA1C MFR BLD HPLC: 5.5 %
HCT VFR BLD CALC: 35.9 %
HDLC SERPL-MCNC: 84 MG/DL
HGB BLD-MCNC: 11.5 G/DL
LDLC SERPL CALC-MCNC: 66 MG/DL
MCHC RBC-ENTMCNC: 30.5 PG
MCHC RBC-ENTMCNC: 32 GM/DL
MCV RBC AUTO: 95.2 FL
NONHDLC SERPL-MCNC: 76 MG/DL
PLATELET # BLD AUTO: 240 K/UL
POTASSIUM SERPL-SCNC: 5.1 MMOL/L
PROT SERPL-MCNC: 7 G/DL
RBC # BLD: 3.77 M/UL
RBC # FLD: 12.8 %
SODIUM SERPL-SCNC: 142 MMOL/L
TRIGL SERPL-MCNC: 46 MG/DL
TSH SERPL-ACNC: 2.34 UIU/ML
WBC # FLD AUTO: 5.52 K/UL

## 2023-08-24 ENCOUNTER — APPOINTMENT (OUTPATIENT)
Dept: INTERNAL MEDICINE | Facility: CLINIC | Age: 83
End: 2023-08-24

## 2023-08-31 ENCOUNTER — APPOINTMENT (OUTPATIENT)
Dept: NEUROLOGY | Facility: CLINIC | Age: 83
End: 2023-08-31
Payer: MEDICARE

## 2023-08-31 VITALS
BODY MASS INDEX: 19.56 KG/M2 | WEIGHT: 97 LBS | HEART RATE: 66 BPM | SYSTOLIC BLOOD PRESSURE: 143 MMHG | HEIGHT: 59 IN | DIASTOLIC BLOOD PRESSURE: 80 MMHG

## 2023-08-31 DIAGNOSIS — F01.50 ALZHEIMER'S DISEASE, UNSPECIFIED: ICD-10-CM

## 2023-08-31 DIAGNOSIS — F02.80 ALZHEIMER'S DISEASE, UNSPECIFIED: ICD-10-CM

## 2023-08-31 DIAGNOSIS — G30.9 ALZHEIMER'S DISEASE, UNSPECIFIED: ICD-10-CM

## 2023-08-31 PROCEDURE — 99213 OFFICE O/P EST LOW 20 MIN: CPT

## 2023-08-31 NOTE — ASSESSMENT
[FreeTextEntry1] : A/P:- Mixed vascular and AD dementia:-  Stable. No behavioral concerns. No progression since last visit.   Continue Aricept 5 mg QD (could not tolerate higher doses) - refilled  Continue ASA 81 mg QD  Encouraged healthy diet, regular exercise, social interaction and cognitive stimulation (reading, word puzzles).   Counselled on the diagnosis of Alzheimer's dementia (AD). Discussed the natural course of AD. As the disease progresses, there is increasing impairment in cognitive functioning, including memory, language, and executive functioning. Behavioral changes also progress as the disease advances, as does one's ability to function independently; eventually requiring full time assistance with daily needs. Counselled on safety and future planning.  There is no curative treatment, however there are therapies available that can help temporarily mitigate cognitive symptoms of AD, but do not halt the overall progression of disease.   RTC 1 year, or sooner if needed.

## 2023-08-31 NOTE — PHYSICAL EXAM
[FreeTextEntry1] : Pleasant elderly female conversational memory intact No anomia MMSE 8/31/2023- 24/30 (-1 date, - town, recall 0/3, - 1 drawing) Gait is slow and cautious. No shuffling. Normal arm swing.

## 2023-08-31 NOTE — HISTORY OF PRESENT ILLNESS
[FreeTextEntry1] : INTERIM HX 08/31/2023: Here with daughter, doing very well. no concerns. no progression in memory. staying active.   HPI (initial visit Aug 18, 2022)- 82 F w/ hx of mixed Alzheimer and vascular dementia, previously followed by Jagdish Mckeon, and I am seeing her in clinic for the first time since Dr. Mckeon has left the practice. Last seen by Dr. Mckeon 7/2021. Reviewed chart notes. SHe has mild-moderate dementia and has been on donepezil 5 mg QD. She is accompanied by daughter, Leslye. She lives with her daughter. She does not drive. She reports she had been seeing Dr. Mckeon for her "memory".  Daughter manages her medication. She never drove. She is from [facility]. She is eating and sleeping well. She enjoys walking and reading and watching TV (cooking shows). She does cook  food- no issues with cooking , not leaving stove on. Daughter report her memory has gotten a little worse. No side effects to donepezil, 10 mg dose made her feel dizzy. NO wandering. No behavioral or mood changes.   MRI brain 7/2019 with evidence of mild- mod chronic microvascular ischemic disease and mild atrophy EEG 2019- mild slowing.  FDG PET scan consistent with Alzheimer disease

## 2023-10-30 ENCOUNTER — APPOINTMENT (OUTPATIENT)
Dept: ENDOCRINOLOGY | Facility: CLINIC | Age: 83
End: 2023-10-30
Payer: MEDICARE

## 2023-10-30 VITALS
DIASTOLIC BLOOD PRESSURE: 76 MMHG | HEART RATE: 81 BPM | HEIGHT: 59 IN | SYSTOLIC BLOOD PRESSURE: 135 MMHG | TEMPERATURE: 98 F | BODY MASS INDEX: 19.56 KG/M2 | WEIGHT: 97 LBS | OXYGEN SATURATION: 98 %

## 2023-10-30 DIAGNOSIS — R73.03 PREDIABETES.: ICD-10-CM

## 2023-10-30 DIAGNOSIS — M81.0 AGE-RELATED OSTEOPOROSIS W/OUT CURRENT PATHOLOGICAL FRACTURE: ICD-10-CM

## 2023-10-30 DIAGNOSIS — E78.5 HYPERLIPIDEMIA, UNSPECIFIED: ICD-10-CM

## 2023-10-30 PROCEDURE — 99204 OFFICE O/P NEW MOD 45 MIN: CPT

## 2023-10-31 LAB
ALBUMIN SERPL ELPH-MCNC: 4.5 G/DL
ALP BLD-CCNC: 82 U/L
ALT SERPL-CCNC: 17 U/L
ANION GAP SERPL CALC-SCNC: 9 MMOL/L
AST SERPL-CCNC: 24 U/L
BILIRUB SERPL-MCNC: 0.2 MG/DL
BUN SERPL-MCNC: 22 MG/DL
CALCIUM SERPL-MCNC: 9.9 MG/DL
CALCIUM SERPL-MCNC: 9.9 MG/DL
CHLORIDE SERPL-SCNC: 105 MMOL/L
CO2 SERPL-SCNC: 28 MMOL/L
CREAT SERPL-MCNC: 0.74 MG/DL
EGFR: 81 ML/MIN/1.73M2
GLUCOSE SERPL-MCNC: 94 MG/DL
MAGNESIUM SERPL-MCNC: 2.2 MG/DL
PARATHYROID HORMONE INTACT: 28 PG/ML
PHOSPHATE SERPL-MCNC: 3.7 MG/DL
POTASSIUM SERPL-SCNC: 5.4 MMOL/L
PROT SERPL-MCNC: 7.2 G/DL
SODIUM SERPL-SCNC: 141 MMOL/L

## 2023-11-01 LAB — PROPEPTIDE TYPE I COLLAGEN: 39.7 NG/ML

## 2023-11-03 LAB — COLLAGEN CTX SERPL-MCNC: 127 PG/ML

## 2024-02-20 ENCOUNTER — RX RENEWAL (OUTPATIENT)
Age: 84
End: 2024-02-20

## 2024-02-20 RX ORDER — ATORVASTATIN CALCIUM 20 MG/1
20 TABLET, FILM COATED ORAL
Qty: 90 | Refills: 1 | Status: ACTIVE | COMMUNITY
Start: 2018-12-01 | End: 1900-01-01

## 2024-03-01 RX ORDER — DONEPEZIL HYDROCHLORIDE 5 MG/1
5 TABLET ORAL
Qty: 90 | Refills: 3 | Status: ACTIVE | COMMUNITY
Start: 2020-02-25 | End: 1900-01-01

## 2024-03-08 NOTE — ED ADULT NURSE NOTE - NS ED PATIENT SAFETY CONCERN
You had a pain management procedure today.    Observe/ monitor for the following signs & symptoms:  If you notice Excessive bleeding (slow general oozing that completely soaks the dressing, or fresh bright red bleeding).   In either case, apply pressure to the area, elevate it if possible & call your doctor at once.    Also observe for:  Change in color  Numbness/tingling  Coldness to the touch  Swelling  Drainage  Temperature of 101.5 or higher.  Increased, uncontrollable pain.    *If you notice the above signs & symptoms, please call your doctor right away!*      Discharge Instructions:    Your pain may not be gone immediately after this procedure; it generally takes 3 to 5 days for the steroid to work.   Keep the needle site clean & dry for 24 hours.  Continue your present medications.  Make an appointment to see your doctor in 2-3 weeks.  If any problems occur, or if you have any further questions, please call as soon as possible. If you find that you cannot reach your doctor, but feel that the condition nees a doctor's attention, go to the closest emergency department & take this discharge paper with you.       Dr. Echols's Office: [phone]          No

## 2024-05-29 RX ORDER — MIRABEGRON 50 MG/1
50 TABLET, FILM COATED, EXTENDED RELEASE ORAL
Qty: 90 | Refills: 3 | Status: ACTIVE | COMMUNITY
Start: 2020-01-21

## 2024-08-09 ENCOUNTER — APPOINTMENT (OUTPATIENT)
Dept: UROLOGY | Facility: CLINIC | Age: 84
End: 2024-08-09

## 2024-08-14 ENCOUNTER — RX RENEWAL (OUTPATIENT)
Age: 84
End: 2024-08-14

## 2024-09-26 ENCOUNTER — APPOINTMENT (OUTPATIENT)
Dept: NEUROLOGY | Facility: CLINIC | Age: 84
End: 2024-09-26

## 2024-10-08 ENCOUNTER — APPOINTMENT (OUTPATIENT)
Dept: UROLOGY | Facility: CLINIC | Age: 84
End: 2024-10-08

## 2024-10-09 ENCOUNTER — APPOINTMENT (OUTPATIENT)
Dept: UROLOGY | Facility: CLINIC | Age: 84
End: 2024-10-09
Payer: MEDICARE

## 2024-10-09 ENCOUNTER — NON-APPOINTMENT (OUTPATIENT)
Age: 84
End: 2024-10-09

## 2024-10-09 VITALS
SYSTOLIC BLOOD PRESSURE: 146 MMHG | HEIGHT: 59 IN | BODY MASS INDEX: 19.76 KG/M2 | DIASTOLIC BLOOD PRESSURE: 71 MMHG | WEIGHT: 98 LBS | RESPIRATION RATE: 17 BRPM | HEART RATE: 76 BPM

## 2024-10-09 DIAGNOSIS — N32.81 OVERACTIVE BLADDER: ICD-10-CM

## 2024-10-09 PROCEDURE — 99213 OFFICE O/P EST LOW 20 MIN: CPT

## 2024-10-29 ENCOUNTER — APPOINTMENT (OUTPATIENT)
Dept: ENDOCRINOLOGY | Facility: CLINIC | Age: 84
End: 2024-10-29
Payer: MEDICARE

## 2024-10-29 VITALS
HEIGHT: 59 IN | HEART RATE: 79 BPM | DIASTOLIC BLOOD PRESSURE: 70 MMHG | SYSTOLIC BLOOD PRESSURE: 169 MMHG | OXYGEN SATURATION: 95 % | BODY MASS INDEX: 19.96 KG/M2 | WEIGHT: 99 LBS

## 2024-10-29 DIAGNOSIS — M81.0 AGE-RELATED OSTEOPOROSIS W/OUT CURRENT PATHOLOGICAL FRACTURE: ICD-10-CM

## 2024-10-29 DIAGNOSIS — R73.03 PREDIABETES.: ICD-10-CM

## 2024-10-29 DIAGNOSIS — E78.5 HYPERLIPIDEMIA, UNSPECIFIED: ICD-10-CM

## 2024-10-29 PROCEDURE — 99214 OFFICE O/P EST MOD 30 MIN: CPT

## 2024-10-29 PROCEDURE — G2211 COMPLEX E/M VISIT ADD ON: CPT

## 2024-10-30 LAB
25(OH)D3 SERPL-MCNC: 55.8 NG/ML
ALBUMIN SERPL ELPH-MCNC: 4.3 G/DL
ALP BLD-CCNC: 93 U/L
ALT SERPL-CCNC: 18 U/L
ANION GAP SERPL CALC-SCNC: 12 MMOL/L
AST SERPL-CCNC: 31 U/L
BILIRUB SERPL-MCNC: 0.2 MG/DL
BUN SERPL-MCNC: 24 MG/DL
CALCIUM SERPL-MCNC: 9.8 MG/DL
CHLORIDE SERPL-SCNC: 102 MMOL/L
CHOLEST SERPL-MCNC: 164 MG/DL
CO2 SERPL-SCNC: 25 MMOL/L
CREAT SERPL-MCNC: 0.73 MG/DL
EGFR: 82 ML/MIN/1.73M2
ESTIMATED AVERAGE GLUCOSE: 111 MG/DL
GLUCOSE SERPL-MCNC: 86 MG/DL
HBA1C MFR BLD HPLC: 5.5 %
HDLC SERPL-MCNC: 92 MG/DL
LDLC SERPL CALC-MCNC: 63 MG/DL
NONHDLC SERPL-MCNC: 72 MG/DL
POTASSIUM SERPL-SCNC: 4.7 MMOL/L
PROT SERPL-MCNC: 7.3 G/DL
SODIUM SERPL-SCNC: 138 MMOL/L
TRIGL SERPL-MCNC: 38 MG/DL

## 2024-11-01 ENCOUNTER — APPOINTMENT (OUTPATIENT)
Dept: RADIOLOGY | Facility: HOSPITAL | Age: 84
End: 2024-11-01
Payer: MEDICARE

## 2024-11-01 ENCOUNTER — NON-APPOINTMENT (OUTPATIENT)
Age: 84
End: 2024-11-01

## 2024-11-01 ENCOUNTER — OUTPATIENT (OUTPATIENT)
Dept: OUTPATIENT SERVICES | Facility: HOSPITAL | Age: 84
LOS: 1 days | End: 2024-11-01
Payer: COMMERCIAL

## 2024-11-01 DIAGNOSIS — Z90.710 ACQUIRED ABSENCE OF BOTH CERVIX AND UTERUS: Chronic | ICD-10-CM

## 2024-11-01 DIAGNOSIS — M81.0 AGE-RELATED OSTEOPOROSIS WITHOUT CURRENT PATHOLOGICAL FRACTURE: ICD-10-CM

## 2024-11-01 PROCEDURE — 77080 DXA BONE DENSITY AXIAL: CPT

## 2024-11-01 PROCEDURE — 77080 DXA BONE DENSITY AXIAL: CPT | Mod: 26

## 2024-11-08 RX ORDER — DENOSUMAB 60 MG/ML
60 INJECTION SUBCUTANEOUS
Qty: 1 | Refills: 1 | Status: ACTIVE | COMMUNITY
Start: 2024-11-08 | End: 1900-01-01

## 2024-11-15 ENCOUNTER — MED ADMIN CHARGE (OUTPATIENT)
Age: 84
End: 2024-11-15

## 2024-11-15 ENCOUNTER — APPOINTMENT (OUTPATIENT)
Dept: ENDOCRINOLOGY | Facility: CLINIC | Age: 84
End: 2024-11-15
Payer: MEDICARE

## 2024-11-15 PROCEDURE — 96401 CHEMO ANTI-NEOPL SQ/IM: CPT

## 2024-11-15 PROCEDURE — 96372 THER/PROPH/DIAG INJ SC/IM: CPT

## 2024-11-15 RX ORDER — DENOSUMAB 60 MG/ML
60 INJECTION SUBCUTANEOUS
Qty: 1 | Refills: 0 | Status: COMPLETED | OUTPATIENT
Start: 2024-11-15

## 2024-11-15 RX ADMIN — DENOSUMAB 60 MG/ML: 60 INJECTION SUBCUTANEOUS at 00:00

## 2025-01-13 ENCOUNTER — NON-APPOINTMENT (OUTPATIENT)
Age: 85
End: 2025-01-13

## 2025-01-13 ENCOUNTER — APPOINTMENT (OUTPATIENT)
Dept: CARDIOLOGY | Facility: CLINIC | Age: 85
End: 2025-01-13
Payer: MEDICARE

## 2025-01-13 VITALS
OXYGEN SATURATION: 96 % | WEIGHT: 99 LBS | HEIGHT: 59 IN | SYSTOLIC BLOOD PRESSURE: 178 MMHG | DIASTOLIC BLOOD PRESSURE: 81 MMHG | BODY MASS INDEX: 19.96 KG/M2 | HEART RATE: 88 BPM

## 2025-01-13 DIAGNOSIS — E78.5 HYPERLIPIDEMIA, UNSPECIFIED: ICD-10-CM

## 2025-01-13 DIAGNOSIS — I10 ESSENTIAL (PRIMARY) HYPERTENSION: ICD-10-CM

## 2025-01-13 DIAGNOSIS — R73.03 PREDIABETES.: ICD-10-CM

## 2025-01-13 DIAGNOSIS — R01.1 CARDIAC MURMUR, UNSPECIFIED: ICD-10-CM

## 2025-01-13 DIAGNOSIS — R55 SYNCOPE AND COLLAPSE: ICD-10-CM

## 2025-01-13 PROCEDURE — 99204 OFFICE O/P NEW MOD 45 MIN: CPT

## 2025-01-13 PROCEDURE — 93000 ELECTROCARDIOGRAM COMPLETE: CPT

## 2025-01-13 PROCEDURE — G2211 COMPLEX E/M VISIT ADD ON: CPT

## 2025-02-04 ENCOUNTER — APPOINTMENT (OUTPATIENT)
Dept: INTERNAL MEDICINE | Facility: CLINIC | Age: 85
End: 2025-02-04
Payer: SELF-PAY

## 2025-02-04 VITALS
OXYGEN SATURATION: 97 % | SYSTOLIC BLOOD PRESSURE: 160 MMHG | HEIGHT: 59 IN | DIASTOLIC BLOOD PRESSURE: 80 MMHG | BODY MASS INDEX: 20.16 KG/M2 | WEIGHT: 100 LBS | HEART RATE: 72 BPM | TEMPERATURE: 97.2 F

## 2025-02-04 VITALS — DIASTOLIC BLOOD PRESSURE: 78 MMHG | SYSTOLIC BLOOD PRESSURE: 184 MMHG

## 2025-02-04 DIAGNOSIS — G30.9 ALZHEIMER'S DISEASE, UNSPECIFIED: ICD-10-CM

## 2025-02-04 DIAGNOSIS — M81.0 AGE-RELATED OSTEOPOROSIS W/OUT CURRENT PATHOLOGICAL FRACTURE: ICD-10-CM

## 2025-02-04 DIAGNOSIS — F02.80 ALZHEIMER'S DISEASE, UNSPECIFIED: ICD-10-CM

## 2025-02-04 DIAGNOSIS — G23.8 OTHER SPECIFIED DEGENERATIVE DISEASES OF BASAL GANGLIA: ICD-10-CM

## 2025-02-04 DIAGNOSIS — Z00.00 ENCOUNTER FOR GENERAL ADULT MEDICAL EXAMINATION W/OUT ABNORMAL FINDINGS: ICD-10-CM

## 2025-02-04 DIAGNOSIS — F01.50 ALZHEIMER'S DISEASE, UNSPECIFIED: ICD-10-CM

## 2025-02-04 DIAGNOSIS — I10 ESSENTIAL (PRIMARY) HYPERTENSION: ICD-10-CM

## 2025-02-04 DIAGNOSIS — N32.81 OVERACTIVE BLADDER: ICD-10-CM

## 2025-02-04 PROCEDURE — 90677 PCV20 VACCINE IM: CPT

## 2025-02-04 PROCEDURE — 90472 IMMUNIZATION ADMIN EACH ADD: CPT

## 2025-02-04 PROCEDURE — 90662 IIV NO PRSV INCREASED AG IM: CPT

## 2025-02-04 PROCEDURE — G0009: CPT

## 2025-02-04 PROCEDURE — G0439: CPT

## 2025-02-05 DIAGNOSIS — D64.9 ANEMIA, UNSPECIFIED: ICD-10-CM

## 2025-02-05 LAB
HCT VFR BLD CALC: 33.9 %
HGB BLD-MCNC: 11.2 G/DL
MCHC RBC-ENTMCNC: 30.4 PG
MCHC RBC-ENTMCNC: 33 G/DL
MCV RBC AUTO: 91.9 FL
PLATELET # BLD AUTO: 266 K/UL
RBC # BLD: 3.69 M/UL
RBC # FLD: 13.3 %
TSH SERPL-ACNC: 2.46 UIU/ML
WBC # FLD AUTO: 6.25 K/UL

## 2025-02-06 RX ORDER — AMLODIPINE BESYLATE 2.5 MG/1
2.5 TABLET ORAL DAILY
Qty: 90 | Refills: 0 | Status: ACTIVE | COMMUNITY
Start: 2025-02-04 | End: 1900-01-01

## 2025-02-07 LAB
FERRITIN SERPL-MCNC: 106 NG/ML
FOLATE SERPL-MCNC: >20 NG/ML
IRON SATN MFR SERPL: 17 %
IRON SERPL-MCNC: 49 UG/DL
TIBC SERPL-MCNC: 287 UG/DL
UIBC SERPL-MCNC: 238 UG/DL
VIT B12 SERPL-MCNC: 596 PG/ML

## 2025-03-07 ENCOUNTER — APPOINTMENT (OUTPATIENT)
Dept: CV DIAGNOSITCS | Facility: HOSPITAL | Age: 85
End: 2025-03-07

## 2025-03-07 ENCOUNTER — OUTPATIENT (OUTPATIENT)
Dept: OUTPATIENT SERVICES | Facility: HOSPITAL | Age: 85
LOS: 1 days | End: 2025-03-07
Payer: MEDICARE

## 2025-03-07 ENCOUNTER — RESULT REVIEW (OUTPATIENT)
Age: 85
End: 2025-03-07

## 2025-03-07 DIAGNOSIS — R01.1 CARDIAC MURMUR, UNSPECIFIED: ICD-10-CM

## 2025-03-07 DIAGNOSIS — R55 SYNCOPE AND COLLAPSE: ICD-10-CM

## 2025-03-07 PROCEDURE — 93306 TTE W/DOPPLER COMPLETE: CPT | Mod: 26

## 2025-03-17 ENCOUNTER — NON-APPOINTMENT (OUTPATIENT)
Age: 85
End: 2025-03-17

## 2025-03-17 ENCOUNTER — APPOINTMENT (OUTPATIENT)
Dept: CARDIOLOGY | Facility: CLINIC | Age: 85
End: 2025-03-17
Payer: MEDICARE

## 2025-03-17 VITALS
HEIGHT: 59 IN | SYSTOLIC BLOOD PRESSURE: 149 MMHG | OXYGEN SATURATION: 99 % | HEART RATE: 69 BPM | WEIGHT: 101 LBS | BODY MASS INDEX: 20.36 KG/M2 | DIASTOLIC BLOOD PRESSURE: 75 MMHG

## 2025-03-17 VITALS — DIASTOLIC BLOOD PRESSURE: 73 MMHG | SYSTOLIC BLOOD PRESSURE: 147 MMHG

## 2025-03-17 DIAGNOSIS — I10 ESSENTIAL (PRIMARY) HYPERTENSION: ICD-10-CM

## 2025-03-17 DIAGNOSIS — R73.03 PREDIABETES.: ICD-10-CM

## 2025-03-17 DIAGNOSIS — R01.1 CARDIAC MURMUR, UNSPECIFIED: ICD-10-CM

## 2025-03-17 DIAGNOSIS — E78.5 HYPERLIPIDEMIA, UNSPECIFIED: ICD-10-CM

## 2025-03-17 DIAGNOSIS — I35.0 NONRHEUMATIC AORTIC (VALVE) STENOSIS: ICD-10-CM

## 2025-03-17 PROCEDURE — 93000 ELECTROCARDIOGRAM COMPLETE: CPT

## 2025-03-17 PROCEDURE — 99214 OFFICE O/P EST MOD 30 MIN: CPT | Mod: 25

## 2025-04-01 ENCOUNTER — NON-APPOINTMENT (OUTPATIENT)
Age: 85
End: 2025-04-01

## 2025-04-01 ENCOUNTER — APPOINTMENT (OUTPATIENT)
Dept: CARDIOLOGY | Facility: CLINIC | Age: 85
End: 2025-04-01
Payer: MEDICARE

## 2025-04-01 VITALS
HEART RATE: 71 BPM | WEIGHT: 98.6 LBS | OXYGEN SATURATION: 100 % | BODY MASS INDEX: 19.88 KG/M2 | SYSTOLIC BLOOD PRESSURE: 138 MMHG | HEIGHT: 59 IN | TEMPERATURE: 97.6 F | DIASTOLIC BLOOD PRESSURE: 68 MMHG | RESPIRATION RATE: 16 BRPM

## 2025-04-01 VITALS — SYSTOLIC BLOOD PRESSURE: 136 MMHG | DIASTOLIC BLOOD PRESSURE: 71 MMHG

## 2025-04-01 PROCEDURE — 93000 ELECTROCARDIOGRAM COMPLETE: CPT

## 2025-04-01 PROCEDURE — 99214 OFFICE O/P EST MOD 30 MIN: CPT | Mod: 25

## 2025-04-09 ENCOUNTER — APPOINTMENT (OUTPATIENT)
Dept: INTERNAL MEDICINE | Facility: CLINIC | Age: 85
End: 2025-04-09
Payer: MEDICARE

## 2025-04-09 VITALS — HEART RATE: 65 BPM | SYSTOLIC BLOOD PRESSURE: 154 MMHG | DIASTOLIC BLOOD PRESSURE: 69 MMHG

## 2025-04-09 VITALS
OXYGEN SATURATION: 97 % | BODY MASS INDEX: 19.96 KG/M2 | HEART RATE: 66 BPM | WEIGHT: 99 LBS | DIASTOLIC BLOOD PRESSURE: 71 MMHG | TEMPERATURE: 97.5 F | SYSTOLIC BLOOD PRESSURE: 169 MMHG | HEIGHT: 59 IN

## 2025-04-09 DIAGNOSIS — E78.5 HYPERLIPIDEMIA, UNSPECIFIED: ICD-10-CM

## 2025-04-09 DIAGNOSIS — I35.0 NONRHEUMATIC AORTIC (VALVE) STENOSIS: ICD-10-CM

## 2025-04-09 DIAGNOSIS — I10 ESSENTIAL (PRIMARY) HYPERTENSION: ICD-10-CM

## 2025-04-09 DIAGNOSIS — R01.1 CARDIAC MURMUR, UNSPECIFIED: ICD-10-CM

## 2025-04-09 DIAGNOSIS — G23.8 OTHER SPECIFIED DEGENERATIVE DISEASES OF BASAL GANGLIA: ICD-10-CM

## 2025-04-09 PROCEDURE — 99214 OFFICE O/P EST MOD 30 MIN: CPT

## 2025-04-10 LAB
ANION GAP SERPL CALC-SCNC: 11 MMOL/L
BUN SERPL-MCNC: 24 MG/DL
CALCIUM SERPL-MCNC: 9.8 MG/DL
CHLORIDE SERPL-SCNC: 102 MMOL/L
CO2 SERPL-SCNC: 28 MMOL/L
CREAT SERPL-MCNC: 0.76 MG/DL
EGFRCR SERPLBLD CKD-EPI 2021: 78 ML/MIN/1.73M2
GLUCOSE SERPL-MCNC: 98 MG/DL
POTASSIUM SERPL-SCNC: 4.9 MMOL/L
SODIUM SERPL-SCNC: 140 MMOL/L

## 2025-04-22 ENCOUNTER — NON-APPOINTMENT (OUTPATIENT)
Age: 85
End: 2025-04-22

## 2025-04-24 ENCOUNTER — APPOINTMENT (OUTPATIENT)
Dept: NEUROLOGY | Facility: CLINIC | Age: 85
End: 2025-04-24

## 2025-04-24 NOTE — PAST MEDICAL HISTORY
Emergency Department Note      History of Present Illness     Chief Complaint   Palpitations      HPI   Max Patiño is a 48 year old male past medical history significant for hypertension, anxiety, paroxysmal A-fib on rhythm controlling medication due to having ablation in 1 month presenting for for worse episode of A-fib.  He reports more frequent symptoms over the last few weeks to the point he has finally decided to have what would be his third ablation.  Denies any overt chest pain or breathlessness.  Denies recent illness, no recent travel, no leg swelling.  He states he feels better now, is ready to go, but needs note for work.    Independent Historian   None    Review of External Notes   EP telephone visit from today, patient called reporting more frequently bothered by episodes of A-fib which is known to be paroxysmal despite increased doses of flecainide now wanting to proceed with ablation, for scheduled for 528,  Dr. Mata's first available appointment they have been offering this for some time, patient has been resistant.  Diabetic    Past Medical History     Medical History and Problem List   Past Medical History:   Diagnosis Date    Anxiety     Difficult intubation     Hypertension 12/14/2011    Morbid obesity (H) 12/14/2011    Sleep apnea 12/14/2011    Uncomplicated asthma        Medications   albuterol (PROAIR HFA/PROVENTIL HFA/VENTOLIN HFA) 108 (90 Base) MCG/ACT inhaler  apixaban ANTICOAGULANT (ELIQUIS) 5 MG tablet  carvedilol (COREG) 25 MG tablet  Coenzyme Q10 (COQ10 PO)  cyclobenzaprine (FLEXERIL) 5 MG tablet  flecainide (TAMBOCOR) 150 MG tablet  levalbuterol (XOPENEX HFA) 45 MCG/ACT inhaler  lisinopril-hydrochlorothiazide (ZESTORETIC) 20-25 MG tablet  Multiple Vitamins-Minerals (CENTRUM ADULT PO)  Omega-3 Fatty Acids (FISH OIL PO)  spironolactone (ALDACTONE) 25 MG tablet        Surgical History   Past Surgical History:   Procedure Laterality Date    EP ABLATION FOCAL AFIB  2023 11/23 at  "[facility] w/ difficult intubtion - case aborted    EP ABLATION FOCAL AFIB N/A 7/8/2024    Procedure: Ablation Atrial Fibrilation;  Surgeon: Kaushal Mata MD;  Location:  [facility]    EP LOOP RECORDER      ORIF right elbow         Physical Exam     Patient Vitals for the past 24 hrs:   BP Temp Temp src Pulse Resp SpO2 Height Weight   04/24/25 1206 123/77 97.6  F (36.4  C) Temporal 61 18 95 % 1.803 m (5' 11\") (!) 137.4 kg (303 lb)     Physical Exam  Constitutional: Alert, attentive, GCS 15   Eyes: EOM are normal, anicteric, conjugate gaze  CV: distal extremities warm, well perfused  Chest: Non-labored breathing on RA  GI:  non tender. No distension. No guarding or rebound.    Neurological: Alert, attentive, moving all extremities equally.   Skin: Skin is warm and dry.      Diagnostics     Lab Results       Imaging   No orders to display       EKG   ECG results from 04/24/25   EKG 12-lead, tracing only     Value    Systolic Blood Pressure     Diastolic Blood Pressure     Ventricular Rate 61    Atrial Rate 61    CA Interval 190    QRS Duration 130        QTc 424    P Axis 64    R AXIS -74    T Axis 103    Interpretation ECG      Sinus rhythm  Left axis deviation  Right bundle branch block  Abnormal ECG  When compared with ECG of 28-Dec-2011 14:45,  Right bundle branch block is now Present  Max Angulo MD  [employer]  1:21 PM 04/24/25              Independent Interpretation   None    ED Course      Medications Administered   Medications - No data to display    Procedures   Procedures     Discussion of Management   None    ED Course        Additional Documentation  None    Medical Decision Making / Diagnosis     CMS Diagnoses: None    MIPS       None    [facility]   Max Patiño is a 48 year old male past medical history significant for paroxysmal A-fib on rhythm controlling medication, 2 failed previous ablations, anxiety, hypertension presenting for evaluation of " longer than normal episode this morning however he feels it has resolved.  EKG here confirms sinus rhythm.  He has no recent illnesses, denies vomiting or diarrhea restage on evaluation in the ED feels better, would like to leave and needs a note for work.  He has been having worsening symptoms over the last 2 weeks to the point he did increase his flecainide now 150 mg twice daily as well as Coreg and is now scheduled for an ablation 5/28.  I recommended continuation of his medication, continue to touch base with EP clinic as there is no need for emergent intervention at this time I do not think labs are warranted.  Return precautions were reviewed, I did stressed the importance of good sleep, good hydration and limiting caffeine, along with alcohol.  He is a former smoker but not using his xopenex much.    Disposition   The patient was discharged.     Diagnosis     ICD-10-CM    1. Paroxysmal atrial fibrillation (H)  I48.0            Max Angulo MD  [employer]  1:38 PM 04/24/25          Max Angulo MD  04/24/25 4630     [Postmenopausal] : postmenopausal [Amenorrhea] : amenorrhea

## 2025-04-25 ENCOUNTER — APPOINTMENT (OUTPATIENT)
Dept: ENDOCRINOLOGY | Facility: CLINIC | Age: 85
End: 2025-04-25
Payer: MEDICARE

## 2025-04-25 VITALS
WEIGHT: 100 LBS | OXYGEN SATURATION: 98 % | BODY MASS INDEX: 20.16 KG/M2 | HEART RATE: 82 BPM | SYSTOLIC BLOOD PRESSURE: 129 MMHG | DIASTOLIC BLOOD PRESSURE: 69 MMHG | HEIGHT: 59 IN

## 2025-04-25 DIAGNOSIS — M81.0 AGE-RELATED OSTEOPOROSIS W/OUT CURRENT PATHOLOGICAL FRACTURE: ICD-10-CM

## 2025-04-25 PROCEDURE — 99215 OFFICE O/P EST HI 40 MIN: CPT

## 2025-04-25 PROCEDURE — G2211 COMPLEX E/M VISIT ADD ON: CPT

## 2025-05-06 ENCOUNTER — RX RENEWAL (OUTPATIENT)
Age: 85
End: 2025-05-06

## 2025-05-22 NOTE — ED PROVIDER NOTE - ABDOMINAL EXAM
[facility]        If you are experiencing any symptoms after discharge, please follow up with your doctor for further instructions.    If you are running late to your appointment, please call the phone number listed below to inform us.    [facility]   [address] [place] [address]     Hours of Operation  Monday, Tuesday, Wednesday, Thursday: 7:00 am-7:00 pm  Friday: 7:00 am- 5:30 pm  Saturday 8:00am- 11:30am     Infusion Scheduling  P:[phone]  F: [phone]       **If your infusion requires blood work be sure to have labs drawn 24-48 hrs prior to your scheduled appointment **       [facility] Hours: Located On The First Floor   Walk in or by appointment  Call Central Scheduling ([phone]) for lab appointment  Monday-Friday: 6:00 am-6:30 pm  Saturday: 6:00 am- 2:30 pm   Sunday: Closed       nondistended/soft/tender...

## 2025-05-27 ENCOUNTER — APPOINTMENT (OUTPATIENT)
Dept: ENDOCRINOLOGY | Facility: CLINIC | Age: 85
End: 2025-05-27
Payer: MEDICARE

## 2025-05-27 PROCEDURE — 96372 THER/PROPH/DIAG INJ SC/IM: CPT
